# Patient Record
Sex: FEMALE | Race: WHITE | NOT HISPANIC OR LATINO
[De-identification: names, ages, dates, MRNs, and addresses within clinical notes are randomized per-mention and may not be internally consistent; named-entity substitution may affect disease eponyms.]

---

## 2017-10-19 ENCOUNTER — OTHER (OUTPATIENT)
Age: 32
End: 2017-10-19

## 2017-10-30 ENCOUNTER — APPOINTMENT (OUTPATIENT)
Dept: OBGYN | Facility: CLINIC | Age: 32
End: 2017-10-30
Payer: COMMERCIAL

## 2017-10-30 VITALS
HEIGHT: 63 IN | BODY MASS INDEX: 29.23 KG/M2 | SYSTOLIC BLOOD PRESSURE: 122 MMHG | DIASTOLIC BLOOD PRESSURE: 64 MMHG | WEIGHT: 165 LBS

## 2017-10-30 DIAGNOSIS — N63.0 UNSPECIFIED LUMP IN UNSPECIFIED BREAST: ICD-10-CM

## 2017-10-30 LAB
BILIRUB UR QL STRIP: NORMAL
GLUCOSE UR-MCNC: NORMAL
HCG UR QL: 0.2 EU/DL
HCG UR QL: POSITIVE
HGB UR QL STRIP.AUTO: NORMAL
KETONES UR-MCNC: NORMAL
LEUKOCYTE ESTERASE UR QL STRIP: NORMAL
NITRITE UR QL STRIP: NORMAL
PH UR STRIP: 6.5
PROT UR STRIP-MCNC: NORMAL
QUALITY CONTROL: YES
SP GR UR STRIP: 1.01

## 2017-10-30 PROCEDURE — 81003 URINALYSIS AUTO W/O SCOPE: CPT | Mod: QW

## 2017-10-30 PROCEDURE — 99395 PREV VISIT EST AGE 18-39: CPT

## 2017-10-30 PROCEDURE — 81025 URINE PREGNANCY TEST: CPT

## 2017-10-31 LAB
BACTERIA UR CULT: NORMAL
C TRACH RRNA SPEC QL NAA+PROBE: NOT DETECTED
HPV HIGH+LOW RISK DNA PNL CVX: NOT DETECTED
N GONORRHOEA RRNA SPEC QL NAA+PROBE: NOT DETECTED
SOURCE AMPLIFICATION: NORMAL
SOURCE TP AMPLIFICATION: NORMAL
T VAGINALIS RRNA SPEC QL NAA+PROBE: NOT DETECTED

## 2017-11-03 LAB — CYTOLOGY CVX/VAG DOC THIN PREP: NORMAL

## 2017-11-07 ENCOUNTER — APPOINTMENT (OUTPATIENT)
Dept: OBGYN | Facility: CLINIC | Age: 32
End: 2017-11-07

## 2017-11-21 ENCOUNTER — NON-APPOINTMENT (OUTPATIENT)
Age: 32
End: 2017-11-21

## 2017-11-21 ENCOUNTER — APPOINTMENT (OUTPATIENT)
Dept: OBGYN | Facility: CLINIC | Age: 32
End: 2017-11-21
Payer: COMMERCIAL

## 2017-11-21 VITALS
DIASTOLIC BLOOD PRESSURE: 66 MMHG | HEIGHT: 63 IN | SYSTOLIC BLOOD PRESSURE: 130 MMHG | BODY MASS INDEX: 29.59 KG/M2 | WEIGHT: 167 LBS

## 2017-11-21 DIAGNOSIS — Z01.419 ENCOUNTER FOR GYNECOLOGICAL EXAMINATION (GENERAL) (ROUTINE) W/OUT ABNORMAL FINDINGS: ICD-10-CM

## 2017-11-21 DIAGNOSIS — Z32.01 ENCOUNTER FOR PREGNANCY TEST, RESULT POSITIVE: ICD-10-CM

## 2017-11-21 LAB
BILIRUB UR QL STRIP: NORMAL
GLUCOSE UR-MCNC: NORMAL
HCG UR QL: 0.2 EU/DL
HGB UR QL STRIP.AUTO: NORMAL
KETONES UR-MCNC: NORMAL
LEUKOCYTE ESTERASE UR QL STRIP: NORMAL
NITRITE UR QL STRIP: NORMAL
PH UR STRIP: 8.5
PROT UR STRIP-MCNC: NORMAL
SP GR UR STRIP: 1.01

## 2017-11-21 PROCEDURE — 0501F PRENATAL FLOW SHEET: CPT

## 2017-11-21 PROCEDURE — 90688 IIV4 VACCINE SPLT 0.5 ML IM: CPT

## 2017-11-21 PROCEDURE — 36415 COLL VENOUS BLD VENIPUNCTURE: CPT

## 2017-11-21 PROCEDURE — 90471 IMMUNIZATION ADMIN: CPT

## 2017-11-22 LAB
ABO + RH PNL BLD: NORMAL
B19V IGG SER QL IA: 7.7 INDEX
B19V IGG+IGM SER-IMP: NORMAL
B19V IGG+IGM SER-IMP: POSITIVE
B19V IGM FLD-ACNC: 0.2 INDEX
B19V IGM SER-ACNC: NEGATIVE
BLD GP AB SCN SERPL QL: NORMAL
HBV SURFACE AG SERPL QL IA: NONREACTIVE
HCV AB SER QL: NONREACTIVE
HCV S/CO RATIO: 0.19 S/CO
HIV1+2 AB SPEC QL IA.RAPID: NONREACTIVE
RUBV IGG FLD-ACNC: 14.1 INDEX
RUBV IGG SER-IMP: POSITIVE
T PALLIDUM AB SER QL IA: NEGATIVE
TSH SERPL-ACNC: 2.48 UIU/ML
VZV AB TITR SER: POSITIVE
VZV IGG SER IF-ACNC: 2895 INDEX

## 2017-11-23 LAB — LEAD BLD-MCNC: <1 UG/DL

## 2017-11-26 LAB
HGB A MFR BLD: 97.1 %
HGB A2 MFR BLD: 2.6 %
HGB F MFR BLD: 0.3 %
HGB FRACT BLD-IMP: NORMAL

## 2017-12-16 LAB
BASOPHILS # BLD AUTO: 0.02 K/UL
BASOPHILS NFR BLD AUTO: 0.2 %
EOSINOPHIL # BLD AUTO: 0.15 K/UL
EOSINOPHIL NFR BLD AUTO: 1.5 %
HCT VFR BLD CALC: 38.9 %
HGB BLD-MCNC: 12.7 G/DL
IMM GRANULOCYTES NFR BLD AUTO: 0.2 %
LYMPHOCYTES # BLD AUTO: 2.61 K/UL
LYMPHOCYTES NFR BLD AUTO: 25.8 %
MAN DIFF?: NORMAL
MCHC RBC-ENTMCNC: 29.1 PG
MCHC RBC-ENTMCNC: 32.6 GM/DL
MCV RBC AUTO: 89.2 FL
MONOCYTES # BLD AUTO: 0.62 K/UL
MONOCYTES NFR BLD AUTO: 6.1 %
NEUTROPHILS # BLD AUTO: 6.71 K/UL
NEUTROPHILS NFR BLD AUTO: 66.2 %
PLATELET # BLD AUTO: 260 K/UL
RBC # BLD: 4.36 M/UL
RBC # FLD: 13.1 %
WBC # FLD AUTO: 10.13 K/UL

## 2017-12-29 ENCOUNTER — NON-APPOINTMENT (OUTPATIENT)
Age: 32
End: 2017-12-29

## 2017-12-29 ENCOUNTER — APPOINTMENT (OUTPATIENT)
Dept: OBGYN | Facility: CLINIC | Age: 32
End: 2017-12-29
Payer: COMMERCIAL

## 2017-12-29 VITALS
BODY MASS INDEX: 29.59 KG/M2 | SYSTOLIC BLOOD PRESSURE: 124 MMHG | HEIGHT: 63 IN | DIASTOLIC BLOOD PRESSURE: 64 MMHG | WEIGHT: 167 LBS

## 2017-12-29 DIAGNOSIS — Z34.81 ENCOUNTER FOR SUPERVISION OF OTHER NORMAL PREGNANCY, FIRST TRIMESTER: ICD-10-CM

## 2017-12-29 LAB
BILIRUB UR QL STRIP: NORMAL
GLUCOSE UR-MCNC: NORMAL
HCG UR QL: 0.2 EU/DL
HGB UR QL STRIP.AUTO: NORMAL
KETONES UR-MCNC: NORMAL
LEUKOCYTE ESTERASE UR QL STRIP: NORMAL
NITRITE UR QL STRIP: NORMAL
PH UR STRIP: 7.5
PROT UR STRIP-MCNC: NORMAL
SP GR UR STRIP: 1.01

## 2017-12-29 PROCEDURE — 0502F SUBSEQUENT PRENATAL CARE: CPT

## 2017-12-29 PROCEDURE — 36415 COLL VENOUS BLD VENIPUNCTURE: CPT

## 2017-12-30 ENCOUNTER — NON-APPOINTMENT (OUTPATIENT)
Age: 32
End: 2017-12-30

## 2018-01-02 LAB
2ND TRIMESTER DATA: NORMAL
AFP PNL SERPL: NORMAL
AFP SERPL-ACNC: NORMAL
CLINICAL BIOCHEMIST REVIEW: NORMAL
NOTES NTD: NORMAL

## 2018-01-30 ENCOUNTER — APPOINTMENT (OUTPATIENT)
Dept: ANTEPARTUM | Facility: CLINIC | Age: 33
End: 2018-01-30
Payer: COMMERCIAL

## 2018-01-30 ENCOUNTER — ASOB RESULT (OUTPATIENT)
Age: 33
End: 2018-01-30

## 2018-01-30 PROCEDURE — 76811 OB US DETAILED SNGL FETUS: CPT

## 2018-02-03 ENCOUNTER — NON-APPOINTMENT (OUTPATIENT)
Age: 33
End: 2018-02-03

## 2018-02-03 ENCOUNTER — APPOINTMENT (OUTPATIENT)
Dept: OBGYN | Facility: CLINIC | Age: 33
End: 2018-02-03
Payer: COMMERCIAL

## 2018-02-03 VITALS
HEIGHT: 63 IN | DIASTOLIC BLOOD PRESSURE: 80 MMHG | SYSTOLIC BLOOD PRESSURE: 160 MMHG | WEIGHT: 174 LBS | BODY MASS INDEX: 30.83 KG/M2

## 2018-02-03 VITALS — DIASTOLIC BLOOD PRESSURE: 60 MMHG | SYSTOLIC BLOOD PRESSURE: 110 MMHG

## 2018-02-03 VITALS — DIASTOLIC BLOOD PRESSURE: 80 MMHG | SYSTOLIC BLOOD PRESSURE: 140 MMHG

## 2018-02-03 PROCEDURE — 0502F SUBSEQUENT PRENATAL CARE: CPT

## 2018-02-04 LAB
BILIRUB UR QL STRIP: NORMAL
GLUCOSE UR-MCNC: 250
HCG UR QL: 0.2 EU/DL
HGB UR QL STRIP.AUTO: NORMAL
KETONES UR-MCNC: NORMAL
LEUKOCYTE ESTERASE UR QL STRIP: NORMAL
NITRITE UR QL STRIP: NORMAL
PH UR STRIP: 6
PROT UR STRIP-MCNC: NORMAL
SP GR UR STRIP: 1.02

## 2018-02-05 LAB — BACTERIA UR CULT: NORMAL

## 2018-02-21 ENCOUNTER — ASOB RESULT (OUTPATIENT)
Age: 33
End: 2018-02-21

## 2018-02-21 ENCOUNTER — APPOINTMENT (OUTPATIENT)
Dept: ANTEPARTUM | Facility: CLINIC | Age: 33
End: 2018-02-21
Payer: COMMERCIAL

## 2018-02-21 PROCEDURE — 76825 ECHO EXAM OF FETAL HEART: CPT

## 2018-02-21 PROCEDURE — 76827 ECHO EXAM OF FETAL HEART: CPT

## 2018-03-05 ENCOUNTER — NON-APPOINTMENT (OUTPATIENT)
Age: 33
End: 2018-03-05

## 2018-03-05 ENCOUNTER — APPOINTMENT (OUTPATIENT)
Dept: OBGYN | Facility: CLINIC | Age: 33
End: 2018-03-05
Payer: COMMERCIAL

## 2018-03-05 VITALS
WEIGHT: 180 LBS | DIASTOLIC BLOOD PRESSURE: 80 MMHG | BODY MASS INDEX: 31.89 KG/M2 | HEIGHT: 63 IN | SYSTOLIC BLOOD PRESSURE: 122 MMHG

## 2018-03-05 DIAGNOSIS — O21.9 VOMITING OF PREGNANCY, UNSPECIFIED: ICD-10-CM

## 2018-03-05 DIAGNOSIS — Z92.29 PERSONAL HISTORY OF OTHER DRUG THERAPY: ICD-10-CM

## 2018-03-05 PROCEDURE — 36415 COLL VENOUS BLD VENIPUNCTURE: CPT

## 2018-03-05 PROCEDURE — 0502F SUBSEQUENT PRENATAL CARE: CPT

## 2018-03-09 LAB
BASOPHILS # BLD AUTO: 0.01 K/UL
BASOPHILS NFR BLD AUTO: 0.1 %
EOSINOPHIL # BLD AUTO: 0.07 K/UL
EOSINOPHIL NFR BLD AUTO: 0.9 %
GLUCOSE 1H P 50 G GLC PO SERPL-MCNC: 108 MG/DL
HCT VFR BLD CALC: 37.4 %
HGB BLD-MCNC: 12.2 G/DL
IMM GRANULOCYTES NFR BLD AUTO: 0.5 %
LYMPHOCYTES # BLD AUTO: 1.24 K/UL
LYMPHOCYTES NFR BLD AUTO: 15.8 %
MAN DIFF?: NORMAL
MCHC RBC-ENTMCNC: 29.3 PG
MCHC RBC-ENTMCNC: 32.6 GM/DL
MCV RBC AUTO: 89.9 FL
MONOCYTES # BLD AUTO: 0.37 K/UL
MONOCYTES NFR BLD AUTO: 4.7 %
NEUTROPHILS # BLD AUTO: 6.12 K/UL
NEUTROPHILS NFR BLD AUTO: 78 %
PLATELET # BLD AUTO: 244 K/UL
RBC # BLD: 4.16 M/UL
RBC # FLD: 13.9 %
WBC # FLD AUTO: 7.85 K/UL

## 2018-03-20 ENCOUNTER — APPOINTMENT (OUTPATIENT)
Dept: NEUROLOGY | Facility: CLINIC | Age: 33
End: 2018-03-20

## 2018-04-03 ENCOUNTER — APPOINTMENT (OUTPATIENT)
Dept: OBGYN | Facility: CLINIC | Age: 33
End: 2018-04-03
Payer: COMMERCIAL

## 2018-04-03 ENCOUNTER — NON-APPOINTMENT (OUTPATIENT)
Age: 33
End: 2018-04-03

## 2018-04-03 VITALS
WEIGHT: 189 LBS | BODY MASS INDEX: 33.49 KG/M2 | DIASTOLIC BLOOD PRESSURE: 68 MMHG | HEIGHT: 63 IN | SYSTOLIC BLOOD PRESSURE: 122 MMHG

## 2018-04-03 DIAGNOSIS — Z34.92 ENCOUNTER FOR SUPERVISION OF NORMAL PREGNANCY, UNSPECIFIED, SECOND TRIMESTER: ICD-10-CM

## 2018-04-03 LAB
BILIRUB UR QL STRIP: NORMAL
GLUCOSE UR-MCNC: NORMAL
HCG UR QL: 0.2 EU/DL
HGB UR QL STRIP.AUTO: NORMAL
KETONES UR-MCNC: NORMAL
LEUKOCYTE ESTERASE UR QL STRIP: NORMAL
NITRITE UR QL STRIP: NORMAL
PH UR STRIP: 6
PROT UR STRIP-MCNC: NORMAL
SP GR UR STRIP: 1.01

## 2018-04-03 PROCEDURE — 0502F SUBSEQUENT PRENATAL CARE: CPT

## 2018-04-05 ENCOUNTER — NON-APPOINTMENT (OUTPATIENT)
Age: 33
End: 2018-04-05

## 2018-04-24 ENCOUNTER — APPOINTMENT (OUTPATIENT)
Dept: OBGYN | Facility: CLINIC | Age: 33
End: 2018-04-24
Payer: COMMERCIAL

## 2018-04-24 VITALS
BODY MASS INDEX: 34.55 KG/M2 | HEIGHT: 63 IN | WEIGHT: 195 LBS | SYSTOLIC BLOOD PRESSURE: 110 MMHG | DIASTOLIC BLOOD PRESSURE: 80 MMHG

## 2018-04-24 LAB
BILIRUB UR QL STRIP: NORMAL
GLUCOSE UR-MCNC: NORMAL
HCG UR QL: 0.2 EU/DL
HGB UR QL STRIP.AUTO: NORMAL
KETONES UR-MCNC: NORMAL
LEUKOCYTE ESTERASE UR QL STRIP: NORMAL
NITRITE UR QL STRIP: NORMAL
PH UR STRIP: 6
PROT UR STRIP-MCNC: NORMAL
SP GR UR STRIP: 1.02

## 2018-04-24 PROCEDURE — 0502F SUBSEQUENT PRENATAL CARE: CPT

## 2018-04-24 PROCEDURE — 76816 OB US FOLLOW-UP PER FETUS: CPT

## 2018-04-25 ENCOUNTER — NON-APPOINTMENT (OUTPATIENT)
Age: 33
End: 2018-04-25

## 2018-04-26 ENCOUNTER — NON-APPOINTMENT (OUTPATIENT)
Age: 33
End: 2018-04-26

## 2018-05-08 ENCOUNTER — NON-APPOINTMENT (OUTPATIENT)
Age: 33
End: 2018-05-08

## 2018-05-08 ENCOUNTER — APPOINTMENT (OUTPATIENT)
Dept: OBGYN | Facility: CLINIC | Age: 33
End: 2018-05-08
Payer: COMMERCIAL

## 2018-05-08 VITALS
SYSTOLIC BLOOD PRESSURE: 120 MMHG | HEIGHT: 63 IN | WEIGHT: 198 LBS | DIASTOLIC BLOOD PRESSURE: 68 MMHG | BODY MASS INDEX: 35.08 KG/M2

## 2018-05-08 PROCEDURE — 0502F SUBSEQUENT PRENATAL CARE: CPT

## 2018-05-09 ENCOUNTER — NON-APPOINTMENT (OUTPATIENT)
Age: 33
End: 2018-05-09

## 2018-05-22 ENCOUNTER — NON-APPOINTMENT (OUTPATIENT)
Age: 33
End: 2018-05-22

## 2018-05-22 ENCOUNTER — APPOINTMENT (OUTPATIENT)
Dept: OBGYN | Facility: CLINIC | Age: 33
End: 2018-05-22
Payer: COMMERCIAL

## 2018-05-22 VITALS
WEIGHT: 200 LBS | HEIGHT: 63 IN | BODY MASS INDEX: 35.44 KG/M2 | DIASTOLIC BLOOD PRESSURE: 88 MMHG | SYSTOLIC BLOOD PRESSURE: 140 MMHG

## 2018-05-22 LAB
BILIRUB UR QL STRIP: NORMAL
GLUCOSE UR-MCNC: 250
HCG UR QL: 0.2 EU/DL
HGB UR QL STRIP.AUTO: NORMAL
KETONES UR-MCNC: NORMAL
LEUKOCYTE ESTERASE UR QL STRIP: NORMAL
NITRITE UR QL STRIP: NORMAL
PH UR STRIP: 6
PROT UR STRIP-MCNC: NORMAL
SP GR UR STRIP: 1.01

## 2018-05-22 PROCEDURE — 0502F SUBSEQUENT PRENATAL CARE: CPT

## 2018-05-22 PROCEDURE — 36415 COLL VENOUS BLD VENIPUNCTURE: CPT

## 2018-05-22 RX ORDER — METOCLOPRAMIDE 10 MG/1
10 TABLET ORAL
Qty: 120 | Refills: 0 | Status: DISCONTINUED | COMMUNITY
Start: 2017-12-29 | End: 2018-05-22

## 2018-05-23 LAB
BASOPHILS # BLD AUTO: 0.02 K/UL
BASOPHILS NFR BLD AUTO: 0.2 %
EOSINOPHIL # BLD AUTO: 0.09 K/UL
EOSINOPHIL NFR BLD AUTO: 1 %
HCT VFR BLD CALC: 38.8 %
HGB BLD-MCNC: 11.9 G/DL
HIV1+2 AB SPEC QL IA.RAPID: NONREACTIVE
IMM GRANULOCYTES NFR BLD AUTO: 0.9 %
LYMPHOCYTES # BLD AUTO: 1.87 K/UL
LYMPHOCYTES NFR BLD AUTO: 21.1 %
MAN DIFF?: NORMAL
MCHC RBC-ENTMCNC: 26.7 PG
MCHC RBC-ENTMCNC: 30.7 GM/DL
MCV RBC AUTO: 87 FL
MONOCYTES # BLD AUTO: 0.79 K/UL
MONOCYTES NFR BLD AUTO: 8.9 %
NEUTROPHILS # BLD AUTO: 6 K/UL
NEUTROPHILS NFR BLD AUTO: 67.9 %
PLATELET # BLD AUTO: 259 K/UL
RBC # BLD: 4.46 M/UL
RBC # FLD: 14 %
WBC # FLD AUTO: 8.85 K/UL

## 2018-05-26 LAB
GP B STREP DNA SPEC QL NAA+PROBE: DETECTED
GP B STREP DNA SPEC QL NAA+PROBE: NORMAL
SOURCE GBS: NORMAL

## 2018-05-29 ENCOUNTER — APPOINTMENT (OUTPATIENT)
Dept: OBGYN | Facility: CLINIC | Age: 33
End: 2018-05-29
Payer: COMMERCIAL

## 2018-05-29 ENCOUNTER — NON-APPOINTMENT (OUTPATIENT)
Age: 33
End: 2018-05-29

## 2018-05-29 VITALS
BODY MASS INDEX: 35.79 KG/M2 | DIASTOLIC BLOOD PRESSURE: 80 MMHG | HEIGHT: 63 IN | WEIGHT: 202 LBS | SYSTOLIC BLOOD PRESSURE: 138 MMHG

## 2018-05-29 LAB
BILIRUB UR QL STRIP: NORMAL
GLUCOSE UR-MCNC: NORMAL
HCG UR QL: 0.2 EU/DL
HGB UR QL STRIP.AUTO: NORMAL
KETONES UR-MCNC: NORMAL
LEUKOCYTE ESTERASE UR QL STRIP: NORMAL
NITRITE UR QL STRIP: NORMAL
PH UR STRIP: 5.5
PROT UR STRIP-MCNC: NORMAL
SP GR UR STRIP: <1.005

## 2018-05-29 PROCEDURE — 99213 OFFICE O/P EST LOW 20 MIN: CPT

## 2018-06-05 ENCOUNTER — APPOINTMENT (OUTPATIENT)
Dept: OBGYN | Facility: CLINIC | Age: 33
End: 2018-06-05
Payer: COMMERCIAL

## 2018-06-05 ENCOUNTER — NON-APPOINTMENT (OUTPATIENT)
Age: 33
End: 2018-06-05

## 2018-06-05 VITALS
HEIGHT: 63 IN | DIASTOLIC BLOOD PRESSURE: 70 MMHG | BODY MASS INDEX: 36.5 KG/M2 | SYSTOLIC BLOOD PRESSURE: 140 MMHG | WEIGHT: 206 LBS

## 2018-06-05 PROCEDURE — 0502F SUBSEQUENT PRENATAL CARE: CPT

## 2018-06-06 ENCOUNTER — NON-APPOINTMENT (OUTPATIENT)
Age: 33
End: 2018-06-06

## 2018-06-12 ENCOUNTER — NON-APPOINTMENT (OUTPATIENT)
Age: 33
End: 2018-06-12

## 2018-06-12 ENCOUNTER — APPOINTMENT (OUTPATIENT)
Dept: OBGYN | Facility: CLINIC | Age: 33
End: 2018-06-12
Payer: COMMERCIAL

## 2018-06-12 VITALS
DIASTOLIC BLOOD PRESSURE: 68 MMHG | BODY MASS INDEX: 36.86 KG/M2 | HEIGHT: 63 IN | WEIGHT: 208 LBS | SYSTOLIC BLOOD PRESSURE: 102 MMHG

## 2018-06-12 PROCEDURE — 0502F SUBSEQUENT PRENATAL CARE: CPT

## 2018-06-13 ENCOUNTER — NON-APPOINTMENT (OUTPATIENT)
Age: 33
End: 2018-06-13

## 2018-06-13 LAB
ALBUMIN SERPL ELPH-MCNC: 3.9 G/DL
ALP BLD-CCNC: 137 U/L
ALT SERPL-CCNC: 15 U/L
ANION GAP SERPL CALC-SCNC: 14 MMOL/L
AST SERPL-CCNC: 22 U/L
BILIRUB SERPL-MCNC: 0.2 MG/DL
BILIRUB UR QL STRIP: NORMAL
BUN SERPL-MCNC: 11 MG/DL
CALCIUM SERPL-MCNC: 9.1 MG/DL
CHLORIDE SERPL-SCNC: 103 MMOL/L
CO2 SERPL-SCNC: 20 MMOL/L
CREAT SERPL-MCNC: 0.66 MG/DL
GLUCOSE SERPL-MCNC: 75 MG/DL
GLUCOSE UR-MCNC: NORMAL
HCG UR QL: 0.2 EU/DL
HGB UR QL STRIP.AUTO: NORMAL
KETONES UR-MCNC: NORMAL
LEUKOCYTE ESTERASE UR QL STRIP: NORMAL
NITRITE UR QL STRIP: NORMAL
PH UR STRIP: 6
POTASSIUM SERPL-SCNC: 4.5 MMOL/L
PROT SERPL-MCNC: 6.6 G/DL
PROT UR STRIP-MCNC: NORMAL
SODIUM SERPL-SCNC: 137 MMOL/L
SP GR UR STRIP: 1.01

## 2018-06-15 ENCOUNTER — TRANSCRIPTION ENCOUNTER (OUTPATIENT)
Age: 33
End: 2018-06-15

## 2018-06-15 LAB — BILE AC SER-MCNC: 4.2 UMOL/L

## 2018-06-16 ENCOUNTER — INPATIENT (INPATIENT)
Facility: HOSPITAL | Age: 33
LOS: 2 days | Discharge: HOME | End: 2018-06-19
Attending: OBSTETRICS & GYNECOLOGY | Admitting: OBSTETRICS & GYNECOLOGY
Payer: COMMERCIAL

## 2018-06-16 ENCOUNTER — TRANSCRIPTION ENCOUNTER (OUTPATIENT)
Age: 33
End: 2018-06-16

## 2018-06-16 VITALS — HEIGHT: 63 IN | WEIGHT: 202.83 LBS

## 2018-06-16 DIAGNOSIS — O26.899 OTHER SPECIFIED PREGNANCY RELATED CONDITIONS, UNSPECIFIED TRIMESTER: ICD-10-CM

## 2018-06-16 DIAGNOSIS — Z3A.00 WEEKS OF GESTATION OF PREGNANCY NOT SPECIFIED: ICD-10-CM

## 2018-06-16 DIAGNOSIS — Z04.9 ENCOUNTER FOR EXAMINATION AND OBSERVATION FOR UNSPECIFIED REASON: ICD-10-CM

## 2018-06-16 LAB
AMNISURE ROM (RUPTURE OF MEMBRANES): POSITIVE — SIGNIFICANT CHANGE UP
BASOPHILS # BLD AUTO: 0.03 K/UL — SIGNIFICANT CHANGE UP (ref 0–0.2)
BASOPHILS NFR BLD AUTO: 0.2 % — SIGNIFICANT CHANGE UP (ref 0–2)
BLD GP AB SCN SERPL QL: NEGATIVE — SIGNIFICANT CHANGE UP
EOSINOPHIL # BLD AUTO: 0.01 K/UL — SIGNIFICANT CHANGE UP (ref 0–0.5)
EOSINOPHIL NFR BLD AUTO: 0.1 % — SIGNIFICANT CHANGE UP (ref 0–6)
HCT VFR BLD CALC: 37.4 % — SIGNIFICANT CHANGE UP (ref 34.5–45)
HGB BLD-MCNC: 12.3 G/DL — SIGNIFICANT CHANGE UP (ref 11.5–15.5)
IMM GRANULOCYTES # BLD AUTO: 0.07 # — SIGNIFICANT CHANGE UP
IMM GRANULOCYTES NFR BLD AUTO: 0.6 % — SIGNIFICANT CHANGE UP (ref 0–1.5)
LYMPHOCYTES # BLD AUTO: 1.47 K/UL — SIGNIFICANT CHANGE UP (ref 1–3.3)
LYMPHOCYTES # BLD AUTO: 12.2 % — LOW (ref 13–44)
MCHC RBC-ENTMCNC: 26.1 PG — LOW (ref 27–34)
MCHC RBC-ENTMCNC: 32.9 % — SIGNIFICANT CHANGE UP (ref 32–36)
MCV RBC AUTO: 79.4 FL — LOW (ref 80–100)
MONOCYTES # BLD AUTO: 0.71 K/UL — SIGNIFICANT CHANGE UP (ref 0–0.9)
MONOCYTES NFR BLD AUTO: 5.9 % — SIGNIFICANT CHANGE UP (ref 2–14)
NEUTROPHILS # BLD AUTO: 9.72 K/UL — HIGH (ref 1.8–7.4)
NEUTROPHILS NFR BLD AUTO: 81 % — HIGH (ref 43–77)
NRBC # FLD: 0 — SIGNIFICANT CHANGE UP
PLATELET # BLD AUTO: 253 K/UL — SIGNIFICANT CHANGE UP (ref 150–400)
PMV BLD: 10.6 FL — SIGNIFICANT CHANGE UP (ref 7–13)
RBC # BLD: 4.71 M/UL — SIGNIFICANT CHANGE UP (ref 3.8–5.2)
RBC # FLD: 13.6 % — SIGNIFICANT CHANGE UP (ref 10.3–14.5)
RH IG SCN BLD-IMP: POSITIVE — SIGNIFICANT CHANGE UP
WBC # BLD: 12.01 K/UL — HIGH (ref 3.8–10.5)
WBC # FLD AUTO: 12.01 K/UL — HIGH (ref 3.8–10.5)

## 2018-06-16 PROCEDURE — 59510 CESAREAN DELIVERY: CPT | Mod: U9,GC

## 2018-06-16 RX ORDER — HEPARIN SODIUM 5000 [USP'U]/ML
5000 INJECTION INTRAVENOUS; SUBCUTANEOUS EVERY 12 HOURS
Qty: 0 | Refills: 0 | Status: DISCONTINUED | OUTPATIENT
Start: 2018-06-16 | End: 2018-06-19

## 2018-06-16 RX ORDER — OXYTOCIN 10 UNIT/ML
333.33 VIAL (ML) INJECTION
Qty: 20 | Refills: 0 | Status: DISCONTINUED | OUTPATIENT
Start: 2018-06-16 | End: 2018-06-18

## 2018-06-16 RX ORDER — GLYCERIN ADULT
1 SUPPOSITORY, RECTAL RECTAL AT BEDTIME
Qty: 0 | Refills: 0 | Status: DISCONTINUED | OUTPATIENT
Start: 2018-06-17 | End: 2018-06-19

## 2018-06-16 RX ORDER — SODIUM CHLORIDE 9 MG/ML
1000 INJECTION, SOLUTION INTRAVENOUS
Qty: 0 | Refills: 0 | Status: DISCONTINUED | OUTPATIENT
Start: 2018-06-16 | End: 2018-06-16

## 2018-06-16 RX ORDER — AMPICILLIN TRIHYDRATE 250 MG
1 CAPSULE ORAL EVERY 4 HOURS
Qty: 0 | Refills: 0 | Status: DISCONTINUED | OUTPATIENT
Start: 2018-06-16 | End: 2018-06-16

## 2018-06-16 RX ORDER — ACETAMINOPHEN 500 MG
975 TABLET ORAL EVERY 6 HOURS
Qty: 0 | Refills: 0 | Status: DISCONTINUED | OUTPATIENT
Start: 2018-06-17 | End: 2018-06-19

## 2018-06-16 RX ORDER — FERROUS SULFATE 325(65) MG
325 TABLET ORAL DAILY
Qty: 0 | Refills: 0 | Status: DISCONTINUED | OUTPATIENT
Start: 2018-06-17 | End: 2018-06-19

## 2018-06-16 RX ORDER — SODIUM CHLORIDE 9 MG/ML
1000 INJECTION, SOLUTION INTRAVENOUS
Qty: 0 | Refills: 0 | Status: DISCONTINUED | OUTPATIENT
Start: 2018-06-17 | End: 2018-06-18

## 2018-06-16 RX ORDER — TETANUS TOXOID, REDUCED DIPHTHERIA TOXOID AND ACELLULAR PERTUSSIS VACCINE, ADSORBED 5; 2.5; 8; 8; 2.5 [IU]/.5ML; [IU]/.5ML; UG/.5ML; UG/.5ML; UG/.5ML
0.5 SUSPENSION INTRAMUSCULAR ONCE
Qty: 0 | Refills: 0 | Status: DISCONTINUED | OUTPATIENT
Start: 2018-06-17 | End: 2018-06-19

## 2018-06-16 RX ORDER — FAMOTIDINE 10 MG/ML
20 INJECTION INTRAVENOUS ONCE
Qty: 0 | Refills: 0 | Status: COMPLETED | OUTPATIENT
Start: 2018-06-16 | End: 2018-06-16

## 2018-06-16 RX ORDER — SIMETHICONE 80 MG/1
80 TABLET, CHEWABLE ORAL EVERY 4 HOURS
Qty: 0 | Refills: 0 | Status: DISCONTINUED | OUTPATIENT
Start: 2018-06-17 | End: 2018-06-19

## 2018-06-16 RX ORDER — SODIUM CHLORIDE 9 MG/ML
500 INJECTION, SOLUTION INTRAVENOUS ONCE
Qty: 0 | Refills: 0 | Status: DISCONTINUED | OUTPATIENT
Start: 2018-06-16 | End: 2018-06-16

## 2018-06-16 RX ORDER — OXYTOCIN 10 UNIT/ML
41.67 VIAL (ML) INJECTION
Qty: 20 | Refills: 0 | Status: DISCONTINUED | OUTPATIENT
Start: 2018-06-16 | End: 2018-06-18

## 2018-06-16 RX ORDER — LANOLIN
1 OINTMENT (GRAM) TOPICAL
Qty: 0 | Refills: 0 | Status: DISCONTINUED | OUTPATIENT
Start: 2018-06-17 | End: 2018-06-19

## 2018-06-16 RX ORDER — AMPICILLIN TRIHYDRATE 250 MG
2 CAPSULE ORAL ONCE
Qty: 0 | Refills: 0 | Status: COMPLETED | OUTPATIENT
Start: 2018-06-16 | End: 2018-06-16

## 2018-06-16 RX ORDER — AMPICILLIN TRIHYDRATE 250 MG
CAPSULE ORAL
Qty: 0 | Refills: 0 | Status: DISCONTINUED | OUTPATIENT
Start: 2018-06-16 | End: 2018-06-16

## 2018-06-16 RX ORDER — OXYTOCIN 10 UNIT/ML
333.33 VIAL (ML) INJECTION
Qty: 20 | Refills: 0 | Status: DISCONTINUED | OUTPATIENT
Start: 2018-06-16 | End: 2018-06-16

## 2018-06-16 RX ORDER — SODIUM CHLORIDE 9 MG/ML
1000 INJECTION, SOLUTION INTRAVENOUS
Qty: 0 | Refills: 0 | Status: DISCONTINUED | OUTPATIENT
Start: 2018-06-16 | End: 2018-06-18

## 2018-06-16 RX ORDER — METOCLOPRAMIDE HCL 10 MG
10 TABLET ORAL ONCE
Qty: 0 | Refills: 0 | Status: COMPLETED | OUTPATIENT
Start: 2018-06-16 | End: 2018-06-16

## 2018-06-16 RX ORDER — DIPHENHYDRAMINE HCL 50 MG
25 CAPSULE ORAL EVERY 6 HOURS
Qty: 0 | Refills: 0 | Status: DISCONTINUED | OUTPATIENT
Start: 2018-06-17 | End: 2018-06-19

## 2018-06-16 RX ORDER — OXYCODONE HYDROCHLORIDE 5 MG/1
5 TABLET ORAL
Qty: 0 | Refills: 0 | Status: COMPLETED | OUTPATIENT
Start: 2018-06-17 | End: 2018-06-24

## 2018-06-16 RX ORDER — OXYCODONE HYDROCHLORIDE 5 MG/1
5 TABLET ORAL EVERY 4 HOURS
Qty: 0 | Refills: 0 | Status: COMPLETED | OUTPATIENT
Start: 2018-06-18 | End: 2018-06-25

## 2018-06-16 RX ORDER — CITRIC ACID/SODIUM CITRATE 300-500 MG
30 SOLUTION, ORAL ORAL ONCE
Qty: 0 | Refills: 0 | Status: DISCONTINUED | OUTPATIENT
Start: 2018-06-16 | End: 2018-06-16

## 2018-06-16 RX ORDER — KETOROLAC TROMETHAMINE 30 MG/ML
30 SYRINGE (ML) INJECTION EVERY 6 HOURS
Qty: 0 | Refills: 0 | Status: DISCONTINUED | OUTPATIENT
Start: 2018-06-17 | End: 2018-06-18

## 2018-06-16 RX ORDER — IBUPROFEN 200 MG
600 TABLET ORAL EVERY 6 HOURS
Qty: 0 | Refills: 0 | Status: COMPLETED | OUTPATIENT
Start: 2018-06-17 | End: 2019-05-16

## 2018-06-16 RX ORDER — OXYTOCIN 10 UNIT/ML
41.67 VIAL (ML) INJECTION
Qty: 20 | Refills: 0 | Status: DISCONTINUED | OUTPATIENT
Start: 2018-06-16 | End: 2018-06-16

## 2018-06-16 RX ORDER — DOCUSATE SODIUM 100 MG
100 CAPSULE ORAL
Qty: 0 | Refills: 0 | Status: DISCONTINUED | OUTPATIENT
Start: 2018-06-17 | End: 2018-06-19

## 2018-06-16 RX ADMIN — Medication 125 MILLIUNIT(S)/MIN: at 23:37

## 2018-06-16 RX ADMIN — Medication 125 MILLIUNIT(S)/MIN: at 22:24

## 2018-06-16 RX ADMIN — Medication 10 MILLIGRAM(S): at 19:14

## 2018-06-16 RX ADMIN — SODIUM CHLORIDE 75 MILLILITER(S): 9 INJECTION, SOLUTION INTRAVENOUS at 23:33

## 2018-06-16 RX ADMIN — FAMOTIDINE 20 MILLIGRAM(S): 10 INJECTION INTRAVENOUS at 19:14

## 2018-06-16 RX ADMIN — Medication 216 GRAM(S): at 17:43

## 2018-06-16 NOTE — PATIENT PROFILE OB - AS DELIV COMPLICATIONS OB
nuchal cord/meconium stained fluid/can x 4 premature rupture of membranes prior to labor/nuchal cord

## 2018-06-16 NOTE — DISCHARGE NOTE OB - CARE PLAN
Principal Discharge DX:	 delivery delivered  Goal:	recovery  Assessment and plan of treatment:	with Dr. Turner in 2 weeks

## 2018-06-16 NOTE — DISCHARGE NOTE OB - MEDICATION SUMMARY - MEDICATIONS TO CHANGE
I will SWITCH the dose or number of times a day I take the medications listed below when I get home from the hospital:    Prenatal 1 oral capsule I will SWITCH the dose or number of times a day I take the medications listed below when I get home from the hospital:  None

## 2018-06-16 NOTE — DISCHARGE NOTE OB - CARE PROVIDER_API CALL
Addi Turner), Obstetrics and Gynecology  [address] [facility]  [address]nd [place] [address]  Phone: [phone]  Fax: [phone]

## 2018-06-16 NOTE — DISCHARGE NOTE OB - PATIENT PORTAL LINK FT
You can access the [facility] Patient Portal, offered by [facility], by registering with the following website: [email or web address]

## 2018-06-17 LAB
HCT VFR BLD CALC: 35.3 % — SIGNIFICANT CHANGE UP (ref 34.5–45)
HGB BLD-MCNC: 11.4 G/DL — LOW (ref 11.5–15.5)
MCHC RBC-ENTMCNC: 26.6 PG — LOW (ref 27–34)
MCHC RBC-ENTMCNC: 32.3 % — SIGNIFICANT CHANGE UP (ref 32–36)
MCV RBC AUTO: 82.3 FL — SIGNIFICANT CHANGE UP (ref 80–100)
NRBC # FLD: 0 — SIGNIFICANT CHANGE UP
PLATELET # BLD AUTO: 207 K/UL — SIGNIFICANT CHANGE UP (ref 150–400)
PMV BLD: 10.7 FL — SIGNIFICANT CHANGE UP (ref 7–13)
RBC # BLD: 4.29 M/UL — SIGNIFICANT CHANGE UP (ref 3.8–5.2)
RBC # FLD: 13.9 % — SIGNIFICANT CHANGE UP (ref 10.3–14.5)
T PALLIDUM AB TITR SER: NEGATIVE — SIGNIFICANT CHANGE UP
WBC # BLD: 9.94 K/UL — SIGNIFICANT CHANGE UP (ref 3.8–10.5)
WBC # FLD AUTO: 9.94 K/UL — SIGNIFICANT CHANGE UP (ref 3.8–10.5)

## 2018-06-17 RX ADMIN — Medication 30 MILLIGRAM(S): at 12:56

## 2018-06-17 RX ADMIN — Medication 30 MILLIGRAM(S): at 19:31

## 2018-06-17 RX ADMIN — Medication 30 MILLIGRAM(S): at 18:47

## 2018-06-17 RX ADMIN — Medication 30 MILLIGRAM(S): at 13:23

## 2018-06-17 RX ADMIN — HEPARIN SODIUM 5000 UNIT(S): 5000 INJECTION INTRAVENOUS; SUBCUTANEOUS at 02:14

## 2018-06-17 RX ADMIN — Medication 975 MILLIGRAM(S): at 19:31

## 2018-06-17 RX ADMIN — Medication 1 TABLET(S): at 12:57

## 2018-06-17 RX ADMIN — Medication 975 MILLIGRAM(S): at 18:48

## 2018-06-17 RX ADMIN — Medication 30 MILLIGRAM(S): at 07:01

## 2018-06-17 RX ADMIN — Medication 975 MILLIGRAM(S): at 13:23

## 2018-06-17 RX ADMIN — Medication 975 MILLIGRAM(S): at 06:32

## 2018-06-17 RX ADMIN — Medication 975 MILLIGRAM(S): at 07:01

## 2018-06-17 RX ADMIN — HEPARIN SODIUM 5000 UNIT(S): 5000 INJECTION INTRAVENOUS; SUBCUTANEOUS at 12:56

## 2018-06-17 RX ADMIN — Medication 30 MILLIGRAM(S): at 06:33

## 2018-06-17 RX ADMIN — Medication 975 MILLIGRAM(S): at 12:57

## 2018-06-17 RX ADMIN — Medication 325 MILLIGRAM(S): at 12:57

## 2018-06-17 NOTE — PROGRESS NOTE ADULT - PROBLEM SELECTOR PLAN 1
-Benadryl for pruritis 2/2 anesthesia  - Continue regular diet.  - Increase ambulation.  -  continue PCEA for pain.  - Discontinue mooney catheter at 24 hours post-op   - F/u AM CBC -Benadryl for pruritis 2/2 anesthesia  - Continue regular diet.  - Increase ambulation.  -  continue po pain meds  - Discontinue mooney catheter at 24 hours post-op   - F/u AM CBC

## 2018-06-18 RX ORDER — OXYCODONE HYDROCHLORIDE 5 MG/1
5 TABLET ORAL
Qty: 0 | Refills: 0 | Status: DISCONTINUED | OUTPATIENT
Start: 2018-06-18 | End: 2018-06-19

## 2018-06-18 RX ORDER — IBUPROFEN 200 MG
600 TABLET ORAL EVERY 6 HOURS
Qty: 0 | Refills: 0 | Status: DISCONTINUED | OUTPATIENT
Start: 2018-06-18 | End: 2018-06-19

## 2018-06-18 RX ORDER — OXYCODONE HYDROCHLORIDE 5 MG/1
5 TABLET ORAL EVERY 4 HOURS
Qty: 0 | Refills: 0 | Status: DISCONTINUED | OUTPATIENT
Start: 2018-06-18 | End: 2018-06-19

## 2018-06-18 RX ORDER — MAGNESIUM HYDROXIDE 400 MG/1
30 TABLET, CHEWABLE ORAL DAILY
Qty: 0 | Refills: 0 | Status: DISCONTINUED | OUTPATIENT
Start: 2018-06-18 | End: 2018-06-19

## 2018-06-18 RX ADMIN — Medication 600 MILLIGRAM(S): at 21:09

## 2018-06-18 RX ADMIN — Medication 600 MILLIGRAM(S): at 08:40

## 2018-06-18 RX ADMIN — HEPARIN SODIUM 5000 UNIT(S): 5000 INJECTION INTRAVENOUS; SUBCUTANEOUS at 11:59

## 2018-06-18 RX ADMIN — Medication 975 MILLIGRAM(S): at 00:36

## 2018-06-18 RX ADMIN — Medication 600 MILLIGRAM(S): at 08:19

## 2018-06-18 RX ADMIN — Medication 1 TABLET(S): at 11:59

## 2018-06-18 RX ADMIN — Medication 600 MILLIGRAM(S): at 20:09

## 2018-06-18 RX ADMIN — Medication 100 MILLIGRAM(S): at 11:59

## 2018-06-18 RX ADMIN — HEPARIN SODIUM 5000 UNIT(S): 5000 INJECTION INTRAVENOUS; SUBCUTANEOUS at 00:11

## 2018-06-18 RX ADMIN — Medication 600 MILLIGRAM(S): at 03:36

## 2018-06-18 RX ADMIN — Medication 975 MILLIGRAM(S): at 00:09

## 2018-06-18 RX ADMIN — Medication 975 MILLIGRAM(S): at 08:19

## 2018-06-18 RX ADMIN — Medication 325 MILLIGRAM(S): at 12:00

## 2018-06-18 RX ADMIN — Medication 975 MILLIGRAM(S): at 20:09

## 2018-06-18 RX ADMIN — Medication 600 MILLIGRAM(S): at 00:10

## 2018-06-18 RX ADMIN — Medication 975 MILLIGRAM(S): at 21:09

## 2018-06-18 RX ADMIN — Medication 975 MILLIGRAM(S): at 08:40

## 2018-06-18 NOTE — PROGRESS NOTE ADULT - ASSESSMENT
INTERVAL HPI/OVERNIGHT EVENTS:  32y Female s/p c section under spinal anesthesia with duramorph for post op analgesia on 6/16/18    Vital Signs Last 24 Hrs  T(C): 36.7 (18 Jun 2018 01:36), Max: 37.2 (17 Jun 2018 21:23)  T(F): 98.1 (18 Jun 2018 01:36), Max: 98.9 (17 Jun 2018 21:23)  HR: 95 (18 Jun 2018 01:36) (86 - 96)  BP: 105/68 (18 Jun 2018 01:36) (100/52 - 116/63)  BP(mean): --  RR: 18 (18 Jun 2018 01:36) (17 - 18)  SpO2: 98% (18 Jun 2018 01:36) (98% - 100%)    Patient seen 07:21, doing well, no anesthetic complications or complaints noted or reported.  Pain is controlled.    Walking.  Had puritis. No other issues.
A/P: 33yo POD#1 s/p rLTCS .  Patient is stable and doing well post-operatively.

## 2018-06-19 ENCOUNTER — APPOINTMENT (OUTPATIENT)
Dept: OBGYN | Facility: CLINIC | Age: 33
End: 2018-06-19

## 2018-06-19 VITALS
DIASTOLIC BLOOD PRESSURE: 66 MMHG | TEMPERATURE: 99 F | HEART RATE: 87 BPM | SYSTOLIC BLOOD PRESSURE: 110 MMHG | RESPIRATION RATE: 17 BRPM | OXYGEN SATURATION: 99 %

## 2018-06-19 RX ORDER — ACETAMINOPHEN 500 MG
3 TABLET ORAL
Qty: 0 | Refills: 0 | COMMUNITY
Start: 2018-06-19

## 2018-06-19 RX ORDER — IBUPROFEN 200 MG
1 TABLET ORAL
Qty: 0 | Refills: 0 | COMMUNITY
Start: 2018-06-19

## 2018-06-19 RX ADMIN — MAGNESIUM HYDROXIDE 30 MILLILITER(S): 400 TABLET, CHEWABLE ORAL at 06:30

## 2018-06-19 RX ADMIN — HEPARIN SODIUM 5000 UNIT(S): 5000 INJECTION INTRAVENOUS; SUBCUTANEOUS at 00:19

## 2018-06-19 RX ADMIN — Medication 600 MILLIGRAM(S): at 03:22

## 2018-06-19 RX ADMIN — Medication 975 MILLIGRAM(S): at 04:22

## 2018-06-19 RX ADMIN — Medication 600 MILLIGRAM(S): at 04:22

## 2018-06-19 RX ADMIN — Medication 975 MILLIGRAM(S): at 03:22

## 2018-06-19 NOTE — PROGRESS NOTE ADULT - SUBJECTIVE AND OBJECTIVE BOX
SUBJECTIVE:    Pain: Controlled    Complaints: None    MILESTONES:    Alert and Oriented x 3  [ x ]  Out of bed/ ambulating. [ x ]  Flatus:   Positive [ x ]  Negative [  ]  Bowel movement  [  ] Positive [  ] Negative   Voiding [x  ] Due to void [  ]   Muniz/Indwelling catheter in place [  ]  Diet: Regular [ x ]  Clears [  ]  NPO [  ]    Infant feeding:  Breast [ X ]   Bottle [  ]  Both [X  ]  Feeding related issues and/or concerns:      OBJECTIVE:  T(C): 36.7 (18 @ 01:36), Max: 37.2 (18 @ 21:23)  HR: 95 (18 @ 01:36) (86 - 96)  BP: 105/68 (18 @ 01:36) (100/52 - 116/63)  RR: 18 (18 @ 01:36) (17 - 18)  SpO2: 98% (18 @ 01:36) (98% - 100%)  Wt(kg): --                        11.4   9.94  )-----------( 207      ( 2018 06:30 )             35.3           Blood Type: O Positive    RPR: Negative          MEDICATIONS  (STANDING):  acetaminophen   Tablet. 975 milliGRAM(s) Oral every 6 hours  diphtheria/tetanus/pertussis (acellular) Vaccine (ADAcel) 0.5 milliLiter(s) IntraMuscular once  ferrous    sulfate 325 milliGRAM(s) Oral daily  heparin  Injectable 5000 Unit(s) SubCutaneous every 12 hours  ibuprofen  Tablet 600 milliGRAM(s) Oral every 6 hours  oxyCODONE    IR 5 milliGRAM(s) Oral every 3 hours  prenatal multivitamin 1 Tablet(s) Oral daily    MEDICATIONS  (PRN):  diphenhydrAMINE   Capsule 25 milliGRAM(s) Oral every 6 hours PRN Itching  docusate sodium 100 milliGRAM(s) Oral two times a day PRN Stool Softening  glycerin Suppository - Adult 1 Suppository(s) Rectal at bedtime PRN Constipation  lanolin Ointment 1 Application(s) Topical every 3 hours PRN Sore Nipples  oxyCODONE    IR 5 milliGRAM(s) Oral every 4 hours PRN Severe Pain (7 - 10)  simethicone 80 milliGRAM(s) Chew every 4 hours PRN Gas        ASSESSMENT:    32y     G  2    P   2002      PO Day#  2        Delivery: Primary [  ]    Repeat [ X ]                                         Indication of procedure: Abnormal Fetal Status, Previous C/S in Labor    Condition: Stable    Past Medical History significant for: HPI:      Current Issues:    Breasts:  Soft [x  ]   Engorged [  ]  Nipples:  Abdomen: Soft [ x ]   Distended [  ] Nontender [  ]     Bowel sounds :  Present [  ]  Absent [  ]   Fundus:  Firm [x  ]  Boggy [  ]    Abdominal incision: Clean, Dry and Intact [x  ]  Staples [  ] Steri Strips [X  ] Dermabond [  ] Sutures [  ]    Patient wearing abdominal binder for support.    Vaginal: Lochia:  Heavy [  ]  Moderate [ x ]   Scant [   ]    Extremities: Edema [X  ] Negative Minna's Sign [ X ] Nontender Angel  [ x ] Positive pedal pulses [  ]    Other relevant physical exam findings:      PLAN:    Plan: Increase ambulation, analgesia PRN and pain medication protocol standing oxycodone, ibuprofen and acetaminophen.    Diet: Regular diet    Continue routine post-operative and postpartum care.     Discharge Planning [ x ]    For discharge Today  [    ]    Consults:  Social Work [  ]  Lactation [ x ]  Other [         ]
INTERVAL HPI/OVERNIGHT EVENTS:  32y Female s/p c section under spinal anesthesia with duramorph for post op analgesia on 6/16/18    Vital Signs Last 24 Hrs  T(C): 36.7 (18 Jun 2018 01:36), Max: 37.2 (17 Jun 2018 21:23)  T(F): 98.1 (18 Jun 2018 01:36), Max: 98.9 (17 Jun 2018 21:23)  HR: 95 (18 Jun 2018 01:36) (86 - 96)  BP: 105/68 (18 Jun 2018 01:36) (100/52 - 116/63)  BP(mean): --  RR: 18 (18 Jun 2018 01:36) (17 - 18)  SpO2: 98% (18 Jun 2018 01:36) (98% - 100%)    Patient seen 07:23 today, doing well, no anesthetic complications or complaints noted or reported.  Pain is controlled.    Sitting up in bed.  No issues.
INTERVAL HPI/OVERNIGHT EVENTS:  32y Female s/p c section under spinal anesthesia with duramorph for post op analgesia on 6/16/18    Vital Signs Last 24 Hrs  T(C): 36.7 (18 Jun 2018 01:36), Max: 37.2 (17 Jun 2018 21:23)  T(F): 98.1 (18 Jun 2018 01:36), Max: 98.9 (17 Jun 2018 21:23)  HR: 95 (18 Jun 2018 01:36) (86 - 96)  BP: 105/68 (18 Jun 2018 01:36) (100/52 - 116/63)  BP(mean): --  RR: 18 (18 Jun 2018 01:36) (17 - 18)  SpO2: 98% (18 Jun 2018 01:36) (98% - 100%)    Patient seen 07:26, doing well, no anesthetic complications or complaints noted or reported.  Pain is controlled.    Mobile, fully.  In street clothes.  No issues.
INTERVAL HPI/OVERNIGHT EVENTS:  32y Female s/p c section under spinal anesthesia with duramorph for post op analgesia on 6/17/18    Vital Signs Last 24 Hrs  T(C): 36.7 (18 Jun 2018 01:36), Max: 37.2 (17 Jun 2018 21:23)  T(F): 98.1 (18 Jun 2018 01:36), Max: 98.9 (17 Jun 2018 21:23)  HR: 95 (18 Jun 2018 01:36) (86 - 96)  BP: 105/68 (18 Jun 2018 01:36) (100/52 - 116/63)  BP(mean): --  RR: 18 (18 Jun 2018 01:36) (17 - 18)  SpO2: 98% (18 Jun 2018 01:36) (98% - 100%)    Patient seen 07:21, doing well, no anesthetic complications or complaints noted or reported.  Pain is controlled.    Lying in bed tired.  No sleep last night from noise of hospital activity. No anesthesia issues.
OB Progress Note:  Delivery, POD#1    S: 31yo  POD#1 s/p rLTCS . Her pain is well controlled. She is tolerating a regular diet and passing flatus. Denies N/V. Denies CP/SOB/lightheadedness/dizziness. She has not yet been out of bed.  Indwelling catheter is in place. She is having some diffuse itchiness.     Vital Signs Last 24 Hrs  T(C): 37.3 (2018 01:23), Max: 37.4 (2018 23:30)  T(F): 99.2 (:23), Max: 99.4 (2018 23:30)  HR: 92 (:) (81 - 95)  BP: 125/55 (:) (104/63 - 142/65)  BP(mean): 73 (2018 00:00) (64 - 84)  RR: 17 (:) (15 - 25)  SpO2: 100% (:) (97% - 100%)    Labs:  Blood type: O Positive  Rubella IgG: RPR:                           12.3   12.01<H> >-----------< 253    ( 06-16 @ 15:50 )             37.4                  PE:  General: NAD  Abdomen: Mildly distended, appropriately tender, incision c/d/i.  Extremities: No erythema, no pitting edema      Shayy Magaña, PGY-1
SUBJECTIVE:    Pain: Controlled    Complaints: None    MILESTONES:    Alert and Oriented x 3  [ x ]  Out of bed/ ambulating. [ x ]  Flatus:   Positive [ x ]  Negative [  ]  Bowel movement  [  ] Positive [ x ] Negative   Voiding [x  ] Due to void [  ]   Diet: Regular [ x ]  Clears [  ]  NPO [  ]    Infant feeding:  Breast [  ]   Bottle [  ]  Both [ x ]  Feeding related issues and/or concerns:  pt states she feels like milk not coming in      OBJECTIVE:  T(C): 37.1 (18 @ 05:21), Max: 37.1 (18 @ 17:58)  HR: 87 (18 @ 05:21) (87 - 100)  BP: 110/66 (18 @ 05:21) (110/66 - 119/79)  RR: 17 (18 @ 05:21) (17 - 18)  SpO2: 99% (18 @ 05:21) (99% - 100%)        Blood Type: O Positive  Rubella: Immune  RPR: Negative          MEDICATIONS  (STANDING):  acetaminophen   Tablet. 975 milliGRAM(s) Oral every 6 hours  diphtheria/tetanus/pertussis (acellular) Vaccine (ADAcel) 0.5 milliLiter(s) IntraMuscular once  ferrous    sulfate 325 milliGRAM(s) Oral daily  heparin  Injectable 5000 Unit(s) SubCutaneous every 12 hours  ibuprofen  Tablet 600 milliGRAM(s) Oral every 6 hours  oxyCODONE    IR 5 milliGRAM(s) Oral every 3 hours  prenatal multivitamin 1 Tablet(s) Oral daily    MEDICATIONS  (PRN):  diphenhydrAMINE   Capsule 25 milliGRAM(s) Oral every 6 hours PRN Itching  docusate sodium 100 milliGRAM(s) Oral two times a day PRN Stool Softening  glycerin Suppository - Adult 1 Suppository(s) Rectal at bedtime PRN Constipation  lanolin Ointment 1 Application(s) Topical every 3 hours PRN Sore Nipples  magnesium hydroxide Suspension 30 milliLiter(s) Oral daily PRN Constipation  oxyCODONE    IR 5 milliGRAM(s) Oral every 4 hours PRN Severe Pain (7 - 10)  simethicone 80 milliGRAM(s) Chew every 4 hours PRN Gas        ASSESSMENT:    32y              PO Day# 3         Delivery: Primary [  ]    Repeat [ x ]                                         Indication of procedure: repeat     Condition: Stable    Past Medical History significant for: HPI: inguinal hernia repair 2005    Current Issues: None    Breasts:  Soft [x  ]   Engorged [  ]  Nipples: WNL  Abdomen: Soft [ x ]   Distended [  ] Nontender [  ]   Bowel sounds :  Present [ x ]  Absent [  ]   Fundus:  Firm [x  ]  Boggy [  ]  Abdominal incision: Clean, Dry and Intact [x  ]  Staples [  ] Steri Strips [  ] Dermabond [ x ] Sutures [  ]    Patient wearing abdominal binder for support.    Vaginal: Lochia:  Heavy [  ]  Moderate [  ]   Scant [ x ]  Extremities: Trace edema [ x ] Negative Minna's Sign [ x ] Nontender Angel  [ x ]      Other relevant physical exam findings: none      PLAN:  Increase ambulation, analgesia PRN and pain medication protocol standing oxycodone, ibuprofen and acetaminophen.    Diet: Regular diet    Continue routine post-operative and postpartum care.     Discharge Planning [ x ]    For discharge Today  [  x  ]    Consults:  Social Work [  ]  Lactation [ x ]  Other [         ]
pt seen and evaluated by me without complaints  +breast, pain well controlled  abd: soft/ fundus firm 2cm below umbilicus  inc: clean and dry and intact  ext: non tender, small edema b/l  plan for dc home in am  f/u 2 week wound check

## 2018-06-27 ENCOUNTER — APPOINTMENT (OUTPATIENT)
Dept: OBGYN | Facility: CLINIC | Age: 33
End: 2018-06-27

## 2018-06-29 ENCOUNTER — APPOINTMENT (OUTPATIENT)
Dept: OBGYN | Facility: CLINIC | Age: 33
End: 2018-06-29
Payer: COMMERCIAL

## 2018-06-29 VITALS
HEIGHT: 63 IN | WEIGHT: 179 LBS | BODY MASS INDEX: 31.71 KG/M2 | SYSTOLIC BLOOD PRESSURE: 102 MMHG | DIASTOLIC BLOOD PRESSURE: 70 MMHG

## 2018-06-29 PROCEDURE — 0503F POSTPARTUM CARE VISIT: CPT

## 2018-06-30 ENCOUNTER — APPOINTMENT (OUTPATIENT)
Dept: ULTRASOUND IMAGING | Facility: CLINIC | Age: 33
End: 2018-06-30
Payer: COMMERCIAL

## 2018-06-30 ENCOUNTER — OUTPATIENT (OUTPATIENT)
Dept: OUTPATIENT SERVICES | Facility: HOSPITAL | Age: 33
LOS: 1 days | End: 2018-06-30
Payer: COMMERCIAL

## 2018-06-30 ENCOUNTER — TRANSCRIPTION ENCOUNTER (OUTPATIENT)
Age: 33
End: 2018-06-30

## 2018-06-30 DIAGNOSIS — Z00.8 ENCOUNTER FOR OTHER GENERAL EXAMINATION: ICD-10-CM

## 2018-06-30 PROCEDURE — 93971 EXTREMITY STUDY: CPT | Mod: 26,LT

## 2018-06-30 PROCEDURE — 93971 EXTREMITY STUDY: CPT

## 2018-07-27 ENCOUNTER — APPOINTMENT (OUTPATIENT)
Dept: OBGYN | Facility: CLINIC | Age: 33
End: 2018-07-27
Payer: COMMERCIAL

## 2018-07-27 VITALS
HEIGHT: 63 IN | SYSTOLIC BLOOD PRESSURE: 108 MMHG | BODY MASS INDEX: 32.43 KG/M2 | DIASTOLIC BLOOD PRESSURE: 70 MMHG | WEIGHT: 183 LBS

## 2018-07-27 PROCEDURE — 0503F POSTPARTUM CARE VISIT: CPT

## 2018-11-14 ENCOUNTER — TRANSCRIPTION ENCOUNTER (OUTPATIENT)
Age: 33
End: 2018-11-14

## 2018-11-19 NOTE — DISCHARGE NOTE OB - TEMPERATURE GREATER THAN 100.0  F ORALLY
Initial Anesthesia Post-op Note    Patient: Alix Bolivar  Procedure(s) Performed: RIGHT DISTAL RADIUS OPEN REDUCTION INTERNAL FIXATION  Anesthesia type: Regional Block    Vital Last Value   Temperature 36.6 °C (97.9 °F) (09/05/17 1650)   Pulse 62 (09/05/17 1705)   Respiratory Rate 16 (09/05/17 1705)   Non-Invasive   Blood Pressure 123/80 (09/05/17 1705)   Arterial  Blood Pressure     Pulse Oximetry 95 % (09/05/17 1705)     Last 24 I/O:   Intake/Output Summary (Last 24 hours) at 09/05/17 1714  Last data filed at 09/05/17 1632   Gross per 24 hour   Intake              900 ml   Output                0 ml   Net              900 ml       PATIENT LOCATION: PACU Phase 1  POST-OP VITAL SIGNS: stable  LEVEL OF CONSCIOUSNESS: participates in exam, awake, oriented, alert and answers questions appropriately  RESPIRATORY STATUS: spontaneous ventilation, unassisted and room air  CARDIOVASCULAR: stable and blood pressure returned to baseline  HYDRATION: euvolemic    PAIN MANAGEMENT: adequately controlled  NAUSEA: None  AIRWAY PATENCY: patent  POST-OP ASSESSMENT: patient tolerated procedure well with no complications, sufficiently recovered from acute administration of anesthesia effects and able to participate in evaluation and regional anesthetic effects remain in place; patient otherwise able to participate in evaluation  Comments: Patient received MONAC for the procedure. Natural airway. No respiratory issues. Patient awake, alert, VSS to PACU. Report given to JOVAN Quinones MD  Anesthesiology  HANDOFF:  Handoff to receiving nurse was performed and questions were answered      
Statement Selected
yes

## 2018-12-03 ENCOUNTER — APPOINTMENT (OUTPATIENT)
Dept: OBGYN | Facility: CLINIC | Age: 33
End: 2018-12-03
Payer: COMMERCIAL

## 2018-12-03 VITALS
BODY MASS INDEX: 32.25 KG/M2 | HEIGHT: 63 IN | DIASTOLIC BLOOD PRESSURE: 66 MMHG | WEIGHT: 182 LBS | SYSTOLIC BLOOD PRESSURE: 116 MMHG

## 2018-12-03 PROCEDURE — 99395 PREV VISIT EST AGE 18-39: CPT

## 2018-12-04 LAB — HPV HIGH+LOW RISK DNA PNL CVX: NOT DETECTED

## 2018-12-07 LAB — CYTOLOGY CVX/VAG DOC THIN PREP: NORMAL

## 2018-12-29 ENCOUNTER — TRANSCRIPTION ENCOUNTER (OUTPATIENT)
Age: 33
End: 2018-12-29

## 2019-03-04 ENCOUNTER — APPOINTMENT (OUTPATIENT)
Dept: OBGYN | Facility: CLINIC | Age: 34
End: 2019-03-04

## 2019-06-16 ENCOUNTER — TRANSCRIPTION ENCOUNTER (OUTPATIENT)
Age: 34
End: 2019-06-16

## 2019-09-28 ENCOUNTER — TRANSCRIPTION ENCOUNTER (OUTPATIENT)
Age: 34
End: 2019-09-28

## 2021-04-06 ENCOUNTER — APPOINTMENT (OUTPATIENT)
Dept: FAMILY MEDICINE | Facility: CLINIC | Age: 36
End: 2021-04-06

## 2021-06-08 ENCOUNTER — NON-APPOINTMENT (OUTPATIENT)
Age: 36
End: 2021-06-08

## 2021-06-08 ENCOUNTER — APPOINTMENT (OUTPATIENT)
Dept: OBGYN | Facility: CLINIC | Age: 36
End: 2021-06-08
Payer: COMMERCIAL

## 2021-06-08 VITALS
HEIGHT: 63 IN | SYSTOLIC BLOOD PRESSURE: 132 MMHG | BODY MASS INDEX: 30.48 KG/M2 | WEIGHT: 172 LBS | DIASTOLIC BLOOD PRESSURE: 80 MMHG

## 2021-06-08 DIAGNOSIS — Z36.89 ENCOUNTER FOR OTHER SPECIFIED ANTENATAL SCREENING: ICD-10-CM

## 2021-06-08 DIAGNOSIS — Z34.93 ENCOUNTER FOR SUPERVISION OF NORMAL PREGNANCY, UNSPECIFIED, THIRD TRIMESTER: ICD-10-CM

## 2021-06-08 DIAGNOSIS — O34.219 MATERNAL CARE FOR UNSPECIFIED TYPE SCAR FROM PREVIOUS CESAREAN DELIVERY: ICD-10-CM

## 2021-06-08 PROCEDURE — 99072 ADDL SUPL MATRL&STAF TM PHE: CPT

## 2021-06-08 PROCEDURE — 99395 PREV VISIT EST AGE 18-39: CPT

## 2021-06-08 NOTE — HISTORY OF PRESENT ILLNESS
[N] : Patient does not use contraception [Y] : Patient is sexually active [Monogamous (Male Partner)] : is monogamous with a male partner [Patient refuses STI testing] : Patient refuses STI testing [FreeTextEntry1] : 34 yo presents for annual exam and pap smear.  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  Otherwise, No complaints at this time. [Mammogramdate] : 2016 [BreastSonogramDate] : 2016 [PapSmeardate] : 2018 [LMPDate] : 05/13/2021

## 2021-06-08 NOTE — DISCUSSION/SUMMARY
[FreeTextEntry1] : Follow up in 1 year for annual exam and pap smear or PRN\par Breast Mammogram and Sonogram

## 2021-06-09 LAB — HPV HIGH+LOW RISK DNA PNL CVX: NOT DETECTED

## 2021-06-14 LAB — CYTOLOGY CVX/VAG DOC THIN PREP: NORMAL

## 2021-06-30 NOTE — DISCHARGE NOTE OB - PROVIDER TOKENS
Call primary care provider for follow up after discharge/Activities as tolerated/Low salt diet/Monitor weight daily/Report signs and symptoms to primary care provider
VERONICA:'[id number]'

## 2021-07-20 ENCOUNTER — APPOINTMENT (OUTPATIENT)
Dept: OBGYN | Facility: CLINIC | Age: 36
End: 2021-07-20

## 2021-07-20 ENCOUNTER — LABORATORY RESULT (OUTPATIENT)
Age: 36
End: 2021-07-20

## 2021-07-20 ENCOUNTER — APPOINTMENT (OUTPATIENT)
Dept: FAMILY MEDICINE | Facility: CLINIC | Age: 36
End: 2021-07-20
Payer: COMMERCIAL

## 2021-07-20 ENCOUNTER — TRANSCRIPTION ENCOUNTER (OUTPATIENT)
Age: 36
End: 2021-07-20

## 2021-07-20 VITALS
DIASTOLIC BLOOD PRESSURE: 80 MMHG | SYSTOLIC BLOOD PRESSURE: 126 MMHG | HEART RATE: 76 BPM | BODY MASS INDEX: 30.12 KG/M2 | WEIGHT: 170 LBS | TEMPERATURE: 97.1 F | HEIGHT: 63 IN | OXYGEN SATURATION: 99 %

## 2021-07-20 PROCEDURE — 99214 OFFICE O/P EST MOD 30 MIN: CPT | Mod: 25

## 2021-07-20 PROCEDURE — 36415 COLL VENOUS BLD VENIPUNCTURE: CPT

## 2021-07-20 PROCEDURE — 99072 ADDL SUPL MATRL&STAF TM PHE: CPT

## 2021-07-20 NOTE — HISTORY OF PRESENT ILLNESS
[FreeTextEntry8] : Fatigue ,and mental fogginess , she is feeling anxious and started running exercise program \par She  feels increase fatigue alan after running during day , she is unsure if she depressed or anxious. \par She would like her vitamin levels checked and any blood work that may be attributing to her symptoms \par She is scheduled with THerapist this week , has history  of depression in teenage years and is  concerned re taking medications .

## 2021-07-20 NOTE — PLAN
[FreeTextEntry1] : I have ordered labs ,including vitamin levels and thyroid panel , Discussed that if labs are normal , I would advised continuation with therapy and at PE OV ,she may discuss possible SSRI use if symptoms persist\par Pt agreed and will work on diet ,exercise ,self relaxation techniques and therapy

## 2021-07-21 ENCOUNTER — TRANSCRIPTION ENCOUNTER (OUTPATIENT)
Age: 36
End: 2021-07-21

## 2021-07-21 LAB
25(OH)D3 SERPL-MCNC: 40.6 NG/ML
ALBUMIN SERPL ELPH-MCNC: 4.9 G/DL
ALP BLD-CCNC: 66 U/L
ALT SERPL-CCNC: 18 U/L
ANION GAP SERPL CALC-SCNC: 11 MMOL/L
AST SERPL-CCNC: 15 U/L
BASOPHILS # BLD AUTO: 0.04 K/UL
BASOPHILS NFR BLD AUTO: 0.6 %
BILIRUB SERPL-MCNC: 0.4 MG/DL
BUN SERPL-MCNC: 12 MG/DL
CALCIUM SERPL-MCNC: 9.7 MG/DL
CHLORIDE SERPL-SCNC: 105 MMOL/L
CO2 SERPL-SCNC: 25 MMOL/L
CREAT SERPL-MCNC: 0.77 MG/DL
EOSINOPHIL # BLD AUTO: 0.25 K/UL
EOSINOPHIL NFR BLD AUTO: 3.5 %
FERRITIN SERPL-MCNC: 99 NG/ML
FOLATE SERPL-MCNC: 12.5 NG/ML
GLUCOSE SERPL-MCNC: 102 MG/DL
HCT VFR BLD CALC: 46.2 %
HGB BLD-MCNC: 14.7 G/DL
IMM GRANULOCYTES NFR BLD AUTO: 0.3 %
IRON SATN MFR SERPL: 31 %
IRON SERPL-MCNC: 101 UG/DL
LYMPHOCYTES # BLD AUTO: 1.43 K/UL
LYMPHOCYTES NFR BLD AUTO: 19.9 %
MAN DIFF?: NORMAL
MCHC RBC-ENTMCNC: 29.2 PG
MCHC RBC-ENTMCNC: 31.8 GM/DL
MCV RBC AUTO: 91.7 FL
MONOCYTES # BLD AUTO: 0.44 K/UL
MONOCYTES NFR BLD AUTO: 6.1 %
NEUTROPHILS # BLD AUTO: 5.02 K/UL
NEUTROPHILS NFR BLD AUTO: 69.6 %
PLATELET # BLD AUTO: 285 K/UL
POTASSIUM SERPL-SCNC: 4.6 MMOL/L
PROT SERPL-MCNC: 7.7 G/DL
RBC # BLD: 5.04 M/UL
RBC # FLD: 12.6 %
SODIUM SERPL-SCNC: 140 MMOL/L
T3RU NFR SERPL: 1 TBI
TIBC SERPL-MCNC: 327 UG/DL
TSH SERPL-ACNC: 1.01 UIU/ML
UIBC SERPL-MCNC: 226 UG/DL
VIT B12 SERPL-MCNC: 508 PG/ML
WBC # FLD AUTO: 7.2 K/UL

## 2021-08-04 ENCOUNTER — APPOINTMENT (OUTPATIENT)
Dept: PSYCHIATRY | Facility: CLINIC | Age: 36
End: 2021-08-04
Payer: COMMERCIAL

## 2021-08-04 PROCEDURE — 99205 OFFICE O/P NEW HI 60 MIN: CPT

## 2021-08-12 ENCOUNTER — RX CHANGE (OUTPATIENT)
Age: 36
End: 2021-08-12

## 2021-08-17 ENCOUNTER — APPOINTMENT (OUTPATIENT)
Dept: PSYCHIATRY | Facility: CLINIC | Age: 36
End: 2021-08-17
Payer: COMMERCIAL

## 2021-08-17 PROCEDURE — 99214 OFFICE O/P EST MOD 30 MIN: CPT | Mod: 95

## 2021-09-16 ENCOUNTER — APPOINTMENT (OUTPATIENT)
Dept: PSYCHIATRY | Facility: CLINIC | Age: 36
End: 2021-09-16
Payer: COMMERCIAL

## 2021-09-16 PROCEDURE — 99214 OFFICE O/P EST MOD 30 MIN: CPT | Mod: 95

## 2021-10-02 PROBLEM — R53.83 FATIGUE: Status: ACTIVE | Noted: 2021-07-20

## 2021-10-02 PROBLEM — Z83.438 FAMILY HISTORY OF HYPERLIPIDEMIA: Status: ACTIVE | Noted: 2021-10-02

## 2021-10-02 PROBLEM — Z82.49 FAMILY HISTORY OF HYPERTENSION: Status: ACTIVE | Noted: 2021-10-02

## 2021-10-05 ENCOUNTER — APPOINTMENT (OUTPATIENT)
Dept: FAMILY MEDICINE | Facility: CLINIC | Age: 36
End: 2021-10-05

## 2021-10-05 ENCOUNTER — NON-APPOINTMENT (OUTPATIENT)
Age: 36
End: 2021-10-05

## 2021-10-05 DIAGNOSIS — Z83.438 FAMILY HISTORY OF OTHER DISORDER OF LIPOPROTEIN METABOLISM AND OTHER LIPIDEMIA: ICD-10-CM

## 2021-10-05 DIAGNOSIS — Z81.8 FAMILY HISTORY OF OTHER MENTAL AND BEHAVIORAL DISORDERS: ICD-10-CM

## 2021-10-05 DIAGNOSIS — Z82.49 FAMILY HISTORY OF ISCHEMIC HEART DISEASE AND OTHER DISEASES OF THE CIRCULATORY SYSTEM: ICD-10-CM

## 2021-10-05 DIAGNOSIS — R53.83 OTHER FATIGUE: ICD-10-CM

## 2021-10-05 DIAGNOSIS — Z72.3 LACK OF PHYSICAL EXERCISE: ICD-10-CM

## 2022-01-18 ENCOUNTER — APPOINTMENT (OUTPATIENT)
Dept: PSYCHIATRY | Facility: CLINIC | Age: 37
End: 2022-01-18
Payer: COMMERCIAL

## 2022-01-18 PROCEDURE — 99214 OFFICE O/P EST MOD 30 MIN: CPT | Mod: 95

## 2022-04-06 ENCOUNTER — APPOINTMENT (OUTPATIENT)
Dept: PSYCHIATRY | Facility: CLINIC | Age: 37
End: 2022-04-06
Payer: COMMERCIAL

## 2022-04-06 PROCEDURE — 99214 OFFICE O/P EST MOD 30 MIN: CPT | Mod: 95

## 2022-06-10 ENCOUNTER — APPOINTMENT (OUTPATIENT)
Dept: OBGYN | Facility: CLINIC | Age: 37
End: 2022-06-10

## 2022-08-04 ENCOUNTER — APPOINTMENT (OUTPATIENT)
Dept: PSYCHIATRY | Facility: CLINIC | Age: 37
End: 2022-08-04

## 2022-08-04 DIAGNOSIS — F41.9 ANXIETY DISORDER, UNSPECIFIED: ICD-10-CM

## 2022-08-04 PROCEDURE — 99214 OFFICE O/P EST MOD 30 MIN: CPT | Mod: 95

## 2022-09-16 ENCOUNTER — APPOINTMENT (OUTPATIENT)
Dept: OBGYN | Facility: CLINIC | Age: 37
End: 2022-09-16

## 2022-09-16 VITALS
SYSTOLIC BLOOD PRESSURE: 114 MMHG | BODY MASS INDEX: 35.08 KG/M2 | HEIGHT: 63 IN | DIASTOLIC BLOOD PRESSURE: 70 MMHG | WEIGHT: 198 LBS

## 2022-09-16 DIAGNOSIS — Z01.419 ENCOUNTER FOR GYNECOLOGICAL EXAMINATION (GENERAL) (ROUTINE) W/OUT ABNORMAL FINDINGS: ICD-10-CM

## 2022-09-16 PROCEDURE — 99395 PREV VISIT EST AGE 18-39: CPT

## 2022-09-16 RX ORDER — ALPRAZOLAM 0.25 MG/1
0.25 TABLET ORAL
Qty: 15 | Refills: 0 | Status: DISCONTINUED | COMMUNITY
Start: 2021-07-21 | End: 2022-09-16

## 2022-09-16 NOTE — HISTORY OF PRESENT ILLNESS
[N] : Patient reports normal menses [Y] : Patient is sexually active [Monogamous (Male Partner)] : is monogamous with a male partner [Patient refuses STI testing] : Patient refuses STI testing [FreeTextEntry1] : 35 yo presents for annual exam and pap smear.  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  Referral was given at last visit but she states she has not had time to go.  Otherwise, No complaints at this time. [PapSmeardate] : 6/8/2021 [LMPDate] : 9/1/22

## 2022-09-19 LAB — HPV HIGH+LOW RISK DNA PNL CVX: NOT DETECTED

## 2022-09-23 LAB — CYTOLOGY CVX/VAG DOC THIN PREP: NORMAL

## 2022-09-27 ENCOUNTER — APPOINTMENT (OUTPATIENT)
Dept: ULTRASOUND IMAGING | Facility: IMAGING CENTER | Age: 37
End: 2022-09-27

## 2022-09-27 ENCOUNTER — OUTPATIENT (OUTPATIENT)
Dept: OUTPATIENT SERVICES | Facility: HOSPITAL | Age: 37
LOS: 1 days | End: 2022-09-27
Payer: COMMERCIAL

## 2022-09-27 ENCOUNTER — RESULT REVIEW (OUTPATIENT)
Age: 37
End: 2022-09-27

## 2022-09-27 ENCOUNTER — NON-APPOINTMENT (OUTPATIENT)
Age: 37
End: 2022-09-27

## 2022-09-27 ENCOUNTER — APPOINTMENT (OUTPATIENT)
Dept: MAMMOGRAPHY | Facility: IMAGING CENTER | Age: 37
End: 2022-09-27

## 2022-09-27 DIAGNOSIS — Z00.8 ENCOUNTER FOR OTHER GENERAL EXAMINATION: ICD-10-CM

## 2022-09-27 PROCEDURE — 77067 SCR MAMMO BI INCL CAD: CPT | Mod: 26

## 2022-09-27 PROCEDURE — 76641 ULTRASOUND BREAST COMPLETE: CPT | Mod: 26,50

## 2022-09-27 PROCEDURE — 77067 SCR MAMMO BI INCL CAD: CPT

## 2022-09-27 PROCEDURE — 76641 ULTRASOUND BREAST COMPLETE: CPT

## 2022-09-27 PROCEDURE — 77063 BREAST TOMOSYNTHESIS BI: CPT | Mod: 26

## 2022-09-27 PROCEDURE — 77063 BREAST TOMOSYNTHESIS BI: CPT

## 2022-09-28 ENCOUNTER — NON-APPOINTMENT (OUTPATIENT)
Age: 37
End: 2022-09-28

## 2022-09-28 DIAGNOSIS — Z23 ENCOUNTER FOR IMMUNIZATION: ICD-10-CM

## 2022-10-03 ENCOUNTER — APPOINTMENT (OUTPATIENT)
Dept: ULTRASOUND IMAGING | Facility: IMAGING CENTER | Age: 37
End: 2022-10-03
Payer: COMMERCIAL

## 2022-10-03 ENCOUNTER — RESULT REVIEW (OUTPATIENT)
Age: 37
End: 2022-10-03

## 2022-10-03 ENCOUNTER — OUTPATIENT (OUTPATIENT)
Dept: OUTPATIENT SERVICES | Facility: HOSPITAL | Age: 37
LOS: 1 days | End: 2022-10-03
Payer: COMMERCIAL

## 2022-10-03 DIAGNOSIS — Z00.8 ENCOUNTER FOR OTHER GENERAL EXAMINATION: ICD-10-CM

## 2022-10-03 PROCEDURE — 76642 ULTRASOUND BREAST LIMITED: CPT | Mod: 26,RT

## 2022-10-03 PROCEDURE — 76642 ULTRASOUND BREAST LIMITED: CPT

## 2022-10-05 ENCOUNTER — LABORATORY RESULT (OUTPATIENT)
Age: 37
End: 2022-10-05

## 2022-10-05 ENCOUNTER — APPOINTMENT (OUTPATIENT)
Dept: FAMILY MEDICINE | Facility: CLINIC | Age: 37
End: 2022-10-05

## 2022-10-05 ENCOUNTER — NON-APPOINTMENT (OUTPATIENT)
Age: 37
End: 2022-10-05

## 2022-10-05 VITALS
OXYGEN SATURATION: 99 % | WEIGHT: 202 LBS | HEIGHT: 63 IN | DIASTOLIC BLOOD PRESSURE: 82 MMHG | SYSTOLIC BLOOD PRESSURE: 108 MMHG | TEMPERATURE: 97.3 F | BODY MASS INDEX: 35.79 KG/M2 | HEART RATE: 81 BPM

## 2022-10-05 DIAGNOSIS — M25.50 PAIN IN UNSPECIFIED JOINT: ICD-10-CM

## 2022-10-05 PROCEDURE — 99395 PREV VISIT EST AGE 18-39: CPT | Mod: 25

## 2022-10-05 PROCEDURE — 90686 IIV4 VACC NO PRSV 0.5 ML IM: CPT

## 2022-10-05 PROCEDURE — 36415 COLL VENOUS BLD VENIPUNCTURE: CPT

## 2022-10-05 PROCEDURE — G0008: CPT

## 2022-10-05 PROCEDURE — 93000 ELECTROCARDIOGRAM COMPLETE: CPT

## 2022-10-05 NOTE — REVIEW OF SYSTEMS
[Recent Change In Weight] : ~T recent weight change [Negative] : Neurological [de-identified] : migraines, "brain fog"

## 2022-10-05 NOTE — ASSESSMENT
[FreeTextEntry1] : FERNIE WAITE is a 36 year old female here for a physical exam.\par \par Patient has a history of anxiety, depression, and panic attacks. Her anxiety is significantly improved since starting Lexapro but she has gained a lot of weight. She exercises regularly but admits that she does not watch her diet and has a large appetite. She is willing to decrease Lexapro or switch to another medication but she is concerned that her anxiety was severe before she started this.\par \par She complains of "brain fog", mainly in the summer. She also reports joint pain.

## 2022-10-05 NOTE — HISTORY OF PRESENT ILLNESS
[FreeTextEntry1] : FERNIE WAITE is a 36 year old female here for a physical exam.  [de-identified] : Her last physical exam was 12/2018; she was scheduled for 10/5/21 but no-showed\par \par Vaccines:\par Tetanus is up to date; last 6/20/2018\par COVID vaccine is up to date\par \par Her last dentist visit was one year ago\par Her last eye doctor appointment was 2 years ago\par Her last dermatologist visit was less than one year ago (Dr. Weeks) \par \par GYN visit is up to date\par Her last mammogram was 9/27/22\par \par Her diet is healthy overall but she has a large appetite since she started Lexapro\par Exercise: running

## 2022-10-05 NOTE — PLAN
[FreeTextEntry1] : Continue all medications as prescribed. Check labs as above. Will check TSH, JAGUAR, and RF due to "brain fog" and joint pain. Will adjust any medications based upon lab results.\par \par She would like to try decreasing Lexapro to see if her anxiety is controlled on a lower dose and to see if this helps her to lose weight. She requests an Rx for 10 mg tablets that she can cut to try 15 mg daily first. If she is unable to lose weight, consider adding Wellbutrin and tapering off of Lexapro.\par \par Reviewed age-appropriate preventive screening tests with patient.\par \par Discussed clean eating (e.g. Mediterranean style diet) and recommendations for regular exercise/staying as physically active as possible.\par \par Reviewed importance of good self care (e.g. meditation, yoga, adequate rest, regular exercise, magnesium, clean eating, etc.).\par \par Follow up for next physical in 1-2 years.\par

## 2022-10-05 NOTE — HEALTH RISK ASSESSMENT
[0] : 2) Feeling down, depressed, or hopeless: Not at all (0) [PHQ-2 Negative - No further assessment needed] : PHQ-2 Negative - No further assessment needed [[id number]] : 0

## 2022-10-08 ENCOUNTER — TRANSCRIPTION ENCOUNTER (OUTPATIENT)
Age: 37
End: 2022-10-08

## 2022-10-08 LAB
ALBUMIN SERPL ELPH-MCNC: 4.9 G/DL
ALP BLD-CCNC: 81 U/L
ALT SERPL-CCNC: 32 U/L
ANA PAT FLD IF-IMP: ABNORMAL
ANACR T: ABNORMAL
ANION GAP SERPL CALC-SCNC: 12 MMOL/L
AST SERPL-CCNC: 28 U/L
BASOPHILS # BLD AUTO: 0.04 K/UL
BASOPHILS NFR BLD AUTO: 0.5 %
BILIRUB SERPL-MCNC: 0.3 MG/DL
BUN SERPL-MCNC: 13 MG/DL
CALCIUM SERPL-MCNC: 9.5 MG/DL
CHLORIDE SERPL-SCNC: 102 MMOL/L
CHOLEST SERPL-MCNC: 223 MG/DL
CO2 SERPL-SCNC: 24 MMOL/L
CREAT SERPL-MCNC: 0.71 MG/DL
EGFR: 113 ML/MIN/1.73M2
EOSINOPHIL # BLD AUTO: 0.18 K/UL
EOSINOPHIL NFR BLD AUTO: 2.1 %
GLUCOSE SERPL-MCNC: 83 MG/DL
HCT VFR BLD CALC: 41.5 %
HDLC SERPL-MCNC: 45 MG/DL
HGB BLD-MCNC: 14.3 G/DL
IMM GRANULOCYTES NFR BLD AUTO: 0.2 %
LDLC SERPL CALC-MCNC: 140 MG/DL
LYMPHOCYTES # BLD AUTO: 2.4 K/UL
LYMPHOCYTES NFR BLD AUTO: 28.4 %
MAN DIFF?: NORMAL
MCHC RBC-ENTMCNC: 29.6 PG
MCHC RBC-ENTMCNC: 34.5 GM/DL
MCV RBC AUTO: 85.9 FL
MONOCYTES # BLD AUTO: 0.5 K/UL
MONOCYTES NFR BLD AUTO: 5.9 %
NEUTROPHILS # BLD AUTO: 5.3 K/UL
NEUTROPHILS NFR BLD AUTO: 62.9 %
NONHDLC SERPL-MCNC: 179 MG/DL
PLATELET # BLD AUTO: 280 K/UL
POTASSIUM SERPL-SCNC: 4.1 MMOL/L
PROT SERPL-MCNC: 7.5 G/DL
RBC # BLD: 4.83 M/UL
RBC # FLD: 12.3 %
RHEUMATOID FACT SER QL: <10 IU/ML
SODIUM SERPL-SCNC: 138 MMOL/L
TRIGL SERPL-MCNC: 194 MG/DL
TSH SERPL-ACNC: 1.48 UIU/ML
WBC # FLD AUTO: 8.44 K/UL

## 2022-10-11 ENCOUNTER — TRANSCRIPTION ENCOUNTER (OUTPATIENT)
Age: 37
End: 2022-10-11

## 2022-10-19 ENCOUNTER — RX CHANGE (OUTPATIENT)
Age: 37
End: 2022-10-19

## 2023-04-20 ENCOUNTER — APPOINTMENT (OUTPATIENT)
Dept: ULTRASOUND IMAGING | Facility: IMAGING CENTER | Age: 38
End: 2023-04-20

## 2023-07-13 ENCOUNTER — APPOINTMENT (OUTPATIENT)
Dept: FAMILY MEDICINE | Facility: CLINIC | Age: 38
End: 2023-07-13

## 2023-07-17 ENCOUNTER — APPOINTMENT (OUTPATIENT)
Dept: FAMILY MEDICINE | Facility: CLINIC | Age: 38
End: 2023-07-17
Payer: COMMERCIAL

## 2023-07-17 VITALS
TEMPERATURE: 97 F | SYSTOLIC BLOOD PRESSURE: 132 MMHG | HEIGHT: 63 IN | HEART RATE: 107 BPM | DIASTOLIC BLOOD PRESSURE: 84 MMHG | OXYGEN SATURATION: 99 % | WEIGHT: 201 LBS | BODY MASS INDEX: 35.61 KG/M2

## 2023-07-17 DIAGNOSIS — E66.01 MORBID (SEVERE) OBESITY DUE TO EXCESS CALORIES: ICD-10-CM

## 2023-07-17 PROCEDURE — 99214 OFFICE O/P EST MOD 30 MIN: CPT

## 2023-07-17 RX ORDER — ESCITALOPRAM OXALATE 20 MG/1
20 TABLET ORAL
Qty: 90 | Refills: 3 | Status: ACTIVE | COMMUNITY
Start: 2021-07-21 | End: 1900-01-01

## 2023-07-17 RX ORDER — ESCITALOPRAM OXALATE 5 MG/1
5 TABLET ORAL
Qty: 90 | Refills: 2 | Status: COMPLETED | COMMUNITY
Start: 2022-10-05 | End: 2023-07-17

## 2023-07-17 NOTE — HISTORY OF PRESENT ILLNESS
[FreeTextEntry1] : FERNIE VENTURAAB is a 37 year old female here for a follow up visit.  [de-identified] : Patient has a history of anxiety, depression, and panic attacks. Her anxiety is significantly improved since starting Lexapro but she has gained a lot of weight. She exercises regularly but admits that she does not watch her diet and has a large appetite. \par \par She also complains of "brain fog", mainly in the summer. She also reports joint pain. She had blood work done at her annual physical in 10/2022. Her JAGUAR was borderline high at 1:80 but the rest of her las were normal. \par \par She planned to cut back on her Lexapro dose to see if this still controlled her anxiety and if a lower dose would help with her weight.\par

## 2023-07-17 NOTE — HEALTH RISK ASSESSMENT
[0] : 2) Feeling down, depressed, or hopeless: Not at all (0) [PHQ-2 Negative - No further assessment needed] : PHQ-2 Negative - No further assessment needed [Never] : Never [[id number]] : 0

## 2023-07-17 NOTE — PLAN
[FreeTextEntry1] : See medications as above.\par \par Will refer to medical weight management. I agreed to prescribe Ozempic until she can get an appointment.\par \par Discussed clean eating (e.g. Mediterranean style diet) and recommendations for regular exercise/staying as physically active as possible.\par \par Reviewed importance of good self care (e.g. meditation, yoga, adequate rest, regular exercise, magnesium, clean eating, etc.).\par \par Face-to-face time spent with patient, over half in discussion of the above diagnoses and treatment plan: 30 minutes.\par

## 2023-07-17 NOTE — REVIEW OF SYSTEMS
[Recent Change In Weight] : ~T recent weight change [Negative] : Heme/Lymph [de-identified] : migraines, "brain fog"

## 2023-07-17 NOTE — ASSESSMENT
[FreeTextEntry1] : She is here to discuss her weight. She has a history of anxiety, depression, and panic attacks treated with Lexapro.\par \par We discussed at her physical that if she was still struggling with her weight and wanted to stop Lexapro she could try Wellbutrin instead, though this can sometimes increase anxiety when it is first started. She tried tapering Lexapro down to 15 mg but felt more anxious so she went back to 20 mg. Her mother  unexpectedly 3 weeks ago and she has been having more anxiety and panic attacks since then. She is going to therapy but feels that she should continue Lexapro 20 mg for now. She has also taken Xanax 0.25 mg in the past and would like a refill for this.\par \par In terms of her weight, she is eating less and exercising more but still not losing weight. She checked with her insurance company and Ozempic is covered without a diagnosis of diabetes. She would like a prescription for this though she is not sure she will start it. She would like a referral for medical weight management as well to explore other options.\par \par \par 
unintentional

## 2023-08-23 ENCOUNTER — RX RENEWAL (OUTPATIENT)
Age: 38
End: 2023-08-23

## 2023-10-27 ENCOUNTER — APPOINTMENT (OUTPATIENT)
Dept: FAMILY MEDICINE | Facility: CLINIC | Age: 38
End: 2023-10-27

## 2023-10-27 DIAGNOSIS — F41.0 PANIC DISORDER [EPISODIC PAROXYSMAL ANXIETY]: ICD-10-CM

## 2023-12-04 ENCOUNTER — RX RENEWAL (OUTPATIENT)
Age: 38
End: 2023-12-04

## 2023-12-27 ENCOUNTER — RX RENEWAL (OUTPATIENT)
Age: 38
End: 2023-12-27

## 2024-01-17 ENCOUNTER — RX RENEWAL (OUTPATIENT)
Age: 39
End: 2024-01-17

## 2024-01-23 ENCOUNTER — APPOINTMENT (OUTPATIENT)
Dept: OBGYN | Facility: CLINIC | Age: 39
End: 2024-01-23
Payer: SELF-PAY

## 2024-01-23 VITALS
BODY MASS INDEX: 33.31 KG/M2 | WEIGHT: 188 LBS | DIASTOLIC BLOOD PRESSURE: 74 MMHG | HEIGHT: 63 IN | SYSTOLIC BLOOD PRESSURE: 120 MMHG

## 2024-01-23 DIAGNOSIS — Z01.419 ENCOUNTER FOR GYNECOLOGICAL EXAMINATION (GENERAL) (ROUTINE) W/OUT ABNORMAL FINDINGS: ICD-10-CM

## 2024-01-23 DIAGNOSIS — N63.10 UNSPECIFIED LUMP IN THE RIGHT BREAST, UNSPECIFIED QUADRANT: ICD-10-CM

## 2024-01-23 PROCEDURE — 99395 PREV VISIT EST AGE 18-39: CPT

## 2024-01-23 NOTE — HISTORY OF PRESENT ILLNESS
[Patient refuses STI testing] : Patient refuses STI testing [FreeTextEntry1] : 39 yo presents for annual exam and pap smear.  History of abnormal breast imagining in the past and recommended for early breast cancer screening.  Otherwise, No complaints at this time. [PapSmeardate] : 9/2022 [LMPDate] : 1/9/24

## 2024-01-25 LAB — HPV HIGH+LOW RISK DNA PNL CVX: NOT DETECTED

## 2024-01-30 LAB — CYTOLOGY CVX/VAG DOC THIN PREP: ABNORMAL

## 2024-02-14 ENCOUNTER — RX RENEWAL (OUTPATIENT)
Age: 39
End: 2024-02-14

## 2024-02-18 ENCOUNTER — NON-APPOINTMENT (OUTPATIENT)
Age: 39
End: 2024-02-18

## 2024-02-20 ENCOUNTER — NON-APPOINTMENT (OUTPATIENT)
Age: 39
End: 2024-02-20

## 2024-02-23 ENCOUNTER — OUTPATIENT (OUTPATIENT)
Dept: OUTPATIENT SERVICES | Facility: HOSPITAL | Age: 39
LOS: 1 days | End: 2024-02-23
Payer: COMMERCIAL

## 2024-02-23 ENCOUNTER — APPOINTMENT (OUTPATIENT)
Dept: BARIATRICS/WEIGHT MGMT | Facility: CLINIC | Age: 39
End: 2024-02-23
Payer: COMMERCIAL

## 2024-02-23 VITALS — HEIGHT: 63 IN | WEIGHT: 185 LBS | BODY MASS INDEX: 32.78 KG/M2

## 2024-02-23 DIAGNOSIS — I10 ESSENTIAL (PRIMARY) HYPERTENSION: ICD-10-CM

## 2024-02-23 PROCEDURE — G0463: CPT

## 2024-02-23 PROCEDURE — 99205 OFFICE O/P NEW HI 60 MIN: CPT | Mod: 95

## 2024-02-23 NOTE — HISTORY OF PRESENT ILLNESS
[FreeTextEntry1] : 37 yo woman with obesity, anxiety HLD and strong family hx of CAD and DM2 presents for initial obesity medicine eval.  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 wendys - burger, cookie, fries, buggets, soda 11 AM, entire pint of ice cream, large amounts of calories). would eat even though she felt full- broke cycle with keto diet helped a lot with semaglutide no longer snacks akanksha's bagel with an egg lunch leftover from night before a lot of take-out not an etoh drinker typically drinks seltzer and coffee with vegan creamer  PHYSICAL ACTIVITY: over the past couple of months a little less exercise (not working and doing a lot of studying) still maintains an active lifestyle - on feet a lot of day with kids goes through formal exercise spurts - when classes started fell off of exercise routine previously had tried to do running  SLEEP: reports excellent sleep  generally fatigued  feels she might sleep too much goes to sleep around 10:30, up at 7AM on weekends could sleep 10 hours  STRESS: has some baseline anxiety - lexapro since 2021  Please refer to intake forms for complete weight and related history.

## 2024-02-23 NOTE — ASSESSMENT
[FreeTextEntry1] : BARIATRIC SURGERY HISTORY: none  OBESITY COMORBIDITIES: HLD  ANTI-OBESITY MEDICATIONS: ozempic  OBESITY MEDICATION SIDE EFFECTS: none  Recommend the following: check metabolic labs  whole foods based eating strategy limiting refined carbs and added fats - recommend WFP[employer] or as close as possible given obesity, anxiety and strong CAD and DM2 family hx add fermented foods 1-2 x/day as well work with NP increased physical activity for now as much as possible cont semaglutide for now  rtc in 4-6 weeks.

## 2024-02-29 LAB
25(OH)D3 SERPL-MCNC: 27.3 NG/ML
ALBUMIN SERPL ELPH-MCNC: 4.6 G/DL
ALP BLD-CCNC: 74 U/L
ALT SERPL-CCNC: 38 U/L
ANION GAP SERPL CALC-SCNC: 10 MMOL/L
AST SERPL-CCNC: 22 U/L
BILIRUB SERPL-MCNC: 0.4 MG/DL
BUN SERPL-MCNC: 11 MG/DL
CALCIUM SERPL-MCNC: 9.1 MG/DL
CHLORIDE SERPL-SCNC: 104 MMOL/L
CHOLEST SERPL-MCNC: 193 MG/DL
CO2 SERPL-SCNC: 24 MMOL/L
CREAT SERPL-MCNC: 0.92 MG/DL
CRP SERPL HS-MCNC: 9.18 MG/L
EGFR: 82 ML/MIN/1.73M2
ESTIMATED AVERAGE GLUCOSE: 103 MG/DL
GLUCOSE SERPL-MCNC: 92 MG/DL
HBA1C MFR BLD HPLC: 5.2 %
HDLC SERPL-MCNC: 38 MG/DL
INSULIN SERPL-MCNC: 21.6 UU/ML
LDLC SERPL CALC-MCNC: 127 MG/DL
NONHDLC SERPL-MCNC: 154 MG/DL
POTASSIUM SERPL-SCNC: 5.1 MMOL/L
PROT SERPL-MCNC: 7.4 G/DL
SODIUM SERPL-SCNC: 138 MMOL/L
T3FREE SERPL-MCNC: 2.39 PG/ML
T4 FREE SERPL-MCNC: 1 NG/DL
TRIGL SERPL-MCNC: 152 MG/DL
TSH SERPL-ACNC: 1.08 UIU/ML

## 2024-03-01 LAB — APO LP(A) SERPL-MCNC: 24 NMOL/L

## 2024-03-04 DIAGNOSIS — E66.9 OBESITY, UNSPECIFIED: ICD-10-CM

## 2024-03-11 ENCOUNTER — RX RENEWAL (OUTPATIENT)
Age: 39
End: 2024-03-11

## 2024-03-13 ENCOUNTER — APPOINTMENT (OUTPATIENT)
Dept: ULTRASOUND IMAGING | Facility: IMAGING CENTER | Age: 39
End: 2024-03-13
Payer: COMMERCIAL

## 2024-03-13 ENCOUNTER — RESULT REVIEW (OUTPATIENT)
Age: 39
End: 2024-03-13

## 2024-03-13 ENCOUNTER — OUTPATIENT (OUTPATIENT)
Dept: OUTPATIENT SERVICES | Facility: HOSPITAL | Age: 39
LOS: 1 days | End: 2024-03-13
Payer: COMMERCIAL

## 2024-03-13 ENCOUNTER — APPOINTMENT (OUTPATIENT)
Dept: MAMMOGRAPHY | Facility: IMAGING CENTER | Age: 39
End: 2024-03-13
Payer: COMMERCIAL

## 2024-03-13 DIAGNOSIS — Z00.8 ENCOUNTER FOR OTHER GENERAL EXAMINATION: ICD-10-CM

## 2024-03-13 PROCEDURE — 77063 BREAST TOMOSYNTHESIS BI: CPT | Mod: 26

## 2024-03-13 PROCEDURE — 77063 BREAST TOMOSYNTHESIS BI: CPT

## 2024-03-13 PROCEDURE — 77067 SCR MAMMO BI INCL CAD: CPT

## 2024-03-13 PROCEDURE — 77067 SCR MAMMO BI INCL CAD: CPT | Mod: 26

## 2024-03-13 PROCEDURE — 76641 ULTRASOUND BREAST COMPLETE: CPT | Mod: 26,50

## 2024-03-13 PROCEDURE — 76641 ULTRASOUND BREAST COMPLETE: CPT

## 2024-03-14 ENCOUNTER — NON-APPOINTMENT (OUTPATIENT)
Age: 39
End: 2024-03-14

## 2024-03-14 DIAGNOSIS — N63.0 UNSPECIFIED LUMP IN UNSPECIFIED BREAST: ICD-10-CM

## 2024-03-18 ENCOUNTER — OUTPATIENT (OUTPATIENT)
Dept: OUTPATIENT SERVICES | Facility: HOSPITAL | Age: 39
LOS: 1 days | End: 2024-03-18
Payer: COMMERCIAL

## 2024-03-18 ENCOUNTER — APPOINTMENT (OUTPATIENT)
Dept: BARIATRICS/WEIGHT MGMT | Facility: CLINIC | Age: 39
End: 2024-03-18
Payer: COMMERCIAL

## 2024-03-18 DIAGNOSIS — I10 ESSENTIAL (PRIMARY) HYPERTENSION: ICD-10-CM

## 2024-03-18 PROCEDURE — 99213 OFFICE O/P EST LOW 20 MIN: CPT | Mod: 95

## 2024-03-18 PROCEDURE — G0463: CPT

## 2024-03-19 VITALS — WEIGHT: 178 LBS | BODY MASS INDEX: 31.54 KG/M2 | HEIGHT: 63 IN

## 2024-03-19 NOTE — HISTORY OF PRESENT ILLNESS
[FreeTextEntry1] : 37 yo woman with obesity, anxiety HLD and strong family hx of CAD and DM2 presents for obesity medicine follow up.   recommended by PCP struglgling to lose weight has lost a little since the summer but had reached peak max weight 2/2023: 212 lost 15 lbs since  lost the rest with keto but also distress with mother's death prior to going on ozempic  Interim hx: -pt new to this provider/reviewed history in chart and with patient.  -has been eating predominantly plant based for past 4 weeks -down 7 lbs, current 178. Reports feels better, happy with how she feels so far.  -Remains on Ozempic 2mg weekly -had labs drawn, reviewed results. Elevated insulin and CRP levels. Discussed reduction of inflammation with diet change.  -Trying new recipes, using forks over knives as resource as well as pinterest.  -eating some fermented foods, pickled cabbage from [employer], whole wheat bread. Drinking smoothies in AM, oats or avacado toast.  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    Pt doing very well and remains highly motivated.

## 2024-03-19 NOTE — REASON FOR VISIT
[Follow-Up] : a follow-up visit [FreeTextEntry1] : obesity [Home] : at home, [unfilled] , at the time of the visit. [Medical Office: ([facility])___] : at the medical office located in  [Patient] : the patient

## 2024-03-19 NOTE — ASSESSMENT
[FreeTextEntry1] : BARIATRIC SURGERY HISTORY: none  OBESITY COMORBIDITIES: HLD  ANTI-OBESITY MEDICATIONS: ozempic  OBESITY MEDICATION SIDE EFFECTS: none  Recommend the following: Reviewed metabolic labs  continue whole foods based eating strategy limiting refined carbs and added fats - recommend WFPB or as close as possible given obesity, anxiety and strong CAD and DM2 family hx Will send nutrition/recipe resources.  Would take Vit B 12 supplement QOD increased physical activity for now as much as possible Continue Ozempic 2mg  rtc in 3 weeks w/Dr Rodriguez and 6-8 weeks with me.

## 2024-03-25 DIAGNOSIS — E66.9 OBESITY, UNSPECIFIED: ICD-10-CM

## 2024-04-05 ENCOUNTER — OUTPATIENT (OUTPATIENT)
Dept: OUTPATIENT SERVICES | Facility: HOSPITAL | Age: 39
LOS: 1 days | End: 2024-04-05
Payer: COMMERCIAL

## 2024-04-05 ENCOUNTER — APPOINTMENT (OUTPATIENT)
Dept: BARIATRICS/WEIGHT MGMT | Facility: CLINIC | Age: 39
End: 2024-04-05
Payer: COMMERCIAL

## 2024-04-05 DIAGNOSIS — I10 ESSENTIAL (PRIMARY) HYPERTENSION: ICD-10-CM

## 2024-04-05 PROCEDURE — G0463: CPT

## 2024-04-05 PROCEDURE — 99213 OFFICE O/P EST LOW 20 MIN: CPT | Mod: 95

## 2024-04-06 NOTE — HISTORY OF PRESENT ILLNESS
Detail Level: Zone
[FreeTextEntry1] : 39 yo woman with obesity, anxiety HLD and strong family hx of CAD and DM2 presents for initial obesity medicine eval.  recommended by PCP struglgling to lose weight has lost a little since the summer but had reached peak max weight 2/2023: 212 current weight = 178 lbs (24 lb weight loss from max) eating mainly whole foods and plant based but some stressful issues in the past week and has been struggling to maintain food quality and also issues with appetite during that period.  Otherwise without new complaints today.

## 2024-04-06 NOTE — ASSESSMENT
[FreeTextEntry1] : BARIATRIC SURGERY HISTORY: none  OBESITY COMORBIDITIES: HLD  ANTI-OBESITY MEDICATIONS: ozempic  OBESITY MEDICATION SIDE EFFECTS: none  Recommend the following:  whole foods based eating strategy limiting refined carbs and added fats - recommend WFPB or as close as possible given obesity, anxiety and strong CAD and DM2 family hx add fermented foods 1-2 x/day as well cont to work with NP increased physical activity for now as much as possible cont semaglutide for now discussed mindful approaches to stress and food choices/prep  rtc in 4-6 weeks.

## 2024-04-15 DIAGNOSIS — E66.9 OBESITY, UNSPECIFIED: ICD-10-CM

## 2024-04-18 ENCOUNTER — RX RENEWAL (OUTPATIENT)
Age: 39
End: 2024-04-18

## 2024-05-06 ENCOUNTER — OUTPATIENT (OUTPATIENT)
Dept: OUTPATIENT SERVICES | Facility: HOSPITAL | Age: 39
LOS: 1 days | End: 2024-05-06
Payer: COMMERCIAL

## 2024-05-06 ENCOUNTER — APPOINTMENT (OUTPATIENT)
Dept: BARIATRICS/WEIGHT MGMT | Facility: CLINIC | Age: 39
End: 2024-05-06
Payer: COMMERCIAL

## 2024-05-06 DIAGNOSIS — I10 ESSENTIAL (PRIMARY) HYPERTENSION: ICD-10-CM

## 2024-05-06 PROCEDURE — G0463: CPT

## 2024-05-06 PROCEDURE — 99213 OFFICE O/P EST LOW 20 MIN: CPT | Mod: 95

## 2024-05-06 NOTE — ASSESSMENT
[FreeTextEntry1] : BARIATRIC SURGERY HISTORY: none  OBESITY COMORBIDITIES: HLD  ANTI-OBESITY MEDICATIONS: ozempic  OBESITY MEDICATION SIDE EFFECTS: none  Recommend the following: continue whole foods based eating strategy limiting refined carbs and added fats.  Can Vit B 12 supplement QOD Focus on meal prep, trying new recipes, building healthy habits with time.  Stress/time management. School ending soon will be easier to focus on health promoting behaviors.  Optimize sleep 7-8 hours/night.  increased physical activity for now as much as possible.  Continue Ozempic 2mg  rtc in 3 weeks w/Dr Rodriguez and 6-8 weeks with me.

## 2024-05-06 NOTE — REASON FOR VISIT
[Follow-Up] : a follow-up visit [Home] : at home, [unfilled] , at the time of the visit. [Medical Office: ([facility])___] : at the medical office located in  [Patient] : the patient [FreeTextEntry1] : obesity

## 2024-05-06 NOTE — HISTORY OF PRESENT ILLNESS
[FreeTextEntry1] : 39 yo woman with obesity, anxiety HLD and strong family hx of CAD and DM2 presents for obesity medicine follow up.   recommended by PCP struglgling to lose weight has lost a little since the summer but had reached peak max weight 2/2023: 212 lost 15 lbs since  lost the rest with keto but also distress with mother's death prior to going on ozempic  Interim hx: -Remains on Ozempic 2mg weekly. Felt cravings coming back the last month -reports last month has been a struggle with healthy eating. Time management, celebrations getting in way at times.  -seeking out more comfort food, not following strict vegan diet recently. Feels anxiety contributing at times.  -sleep has been disrupted some due to having new puppy -readying for finals now, will have more free time on her hands this summer.  -discussed not letting accomplishing perfection derail her and to focus on the why of improving health.  -discussed building healthy habits takes time and patience.   Pt remains motivated at this time.

## 2024-05-13 DIAGNOSIS — E66.9 OBESITY, UNSPECIFIED: ICD-10-CM

## 2024-05-13 DIAGNOSIS — E78.2 MIXED HYPERLIPIDEMIA: ICD-10-CM

## 2024-05-28 ENCOUNTER — RX RENEWAL (OUTPATIENT)
Age: 39
End: 2024-05-28

## 2024-05-31 ENCOUNTER — OUTPATIENT (OUTPATIENT)
Dept: OUTPATIENT SERVICES | Facility: HOSPITAL | Age: 39
LOS: 1 days | End: 2024-05-31
Payer: COMMERCIAL

## 2024-05-31 ENCOUNTER — APPOINTMENT (OUTPATIENT)
Dept: BARIATRICS/WEIGHT MGMT | Facility: CLINIC | Age: 39
End: 2024-05-31
Payer: COMMERCIAL

## 2024-05-31 DIAGNOSIS — I10 ESSENTIAL (PRIMARY) HYPERTENSION: ICD-10-CM

## 2024-05-31 PROCEDURE — G0463: CPT

## 2024-05-31 PROCEDURE — 99213 OFFICE O/P EST LOW 20 MIN: CPT | Mod: 95

## 2024-05-31 NOTE — HISTORY OF PRESENT ILLNESS
[FreeTextEntry1] : 37 yo woman with obesity, anxiety HLD and strong family hx of CAD and DM2 presents for initial obesity medicine eval.  recommended by PCP continues struggling to lose weight has lost a little since the summer but had reached peak max weight 2/2023: 212 current weight = 178 lbs (34 lb weight loss from max but unchanged over past several months) was eating mainly whole foods and plants but having a difficult time sustaining this feels that stress and schedule often get in the way of her planning and then eats more comfort or convenient food   Otherwise without new complaints today.

## 2024-05-31 NOTE — ASSESSMENT
[FreeTextEntry1] : BARIATRIC SURGERY HISTORY: none  OBESITY COMORBIDITIES: HLD  ANTI-OBESITY MEDICATIONS: ozempic  OBESITY MEDICATION SIDE EFFECTS: none  Recommend the following:  whole foods based eating strategy limiting refined carbs and added fats - recommend WFPB or as close as possible given obesity, anxiety and strong CAD and DM2 family hx add fermented foods 1-2 x/day as well cont semaglutide 2mg can consider change to tirzepatide but has some lilfestyle and planning issues that she should try to remedy will engage in goal-directed moderate intensity exercise (such as planning a 5k race and training for it) back to food prep  rtc in 4-6 weeks.

## 2024-06-05 ENCOUNTER — APPOINTMENT (OUTPATIENT)
Dept: FAMILY MEDICINE | Facility: CLINIC | Age: 39
End: 2024-06-05
Payer: COMMERCIAL

## 2024-06-05 ENCOUNTER — NON-APPOINTMENT (OUTPATIENT)
Age: 39
End: 2024-06-05

## 2024-06-05 VITALS
HEART RATE: 101 BPM | BODY MASS INDEX: 32.43 KG/M2 | SYSTOLIC BLOOD PRESSURE: 122 MMHG | WEIGHT: 183 LBS | TEMPERATURE: 98.7 F | HEIGHT: 63 IN | OXYGEN SATURATION: 97 % | DIASTOLIC BLOOD PRESSURE: 62 MMHG

## 2024-06-05 DIAGNOSIS — F41.8 OTHER SPECIFIED ANXIETY DISORDERS: ICD-10-CM

## 2024-06-05 DIAGNOSIS — Z01.84 ENCOUNTER FOR ANTIBODY RESPONSE EXAMINATION: ICD-10-CM

## 2024-06-05 DIAGNOSIS — Z11.1 ENCOUNTER FOR SCREENING FOR RESPIRATORY TUBERCULOSIS: ICD-10-CM

## 2024-06-05 DIAGNOSIS — Z00.00 ENCOUNTER FOR GENERAL ADULT MEDICAL EXAMINATION W/OUT ABNORMAL FINDINGS: ICD-10-CM

## 2024-06-05 PROCEDURE — 36415 COLL VENOUS BLD VENIPUNCTURE: CPT

## 2024-06-05 PROCEDURE — 99395 PREV VISIT EST AGE 18-39: CPT

## 2024-06-05 PROCEDURE — 93000 ELECTROCARDIOGRAM COMPLETE: CPT

## 2024-06-05 RX ORDER — SEMAGLUTIDE 1.34 MG/ML
4 INJECTION, SOLUTION SUBCUTANEOUS WEEKLY
Qty: 1 | Refills: 0 | Status: DISCONTINUED | COMMUNITY
Start: 2023-07-17 | End: 2024-06-05

## 2024-06-05 RX ORDER — ALPRAZOLAM 0.25 MG/1
0.25 TABLET ORAL
Qty: 30 | Refills: 0 | Status: DISCONTINUED | COMMUNITY
Start: 2023-07-17 | End: 2024-06-05

## 2024-06-05 NOTE — ASSESSMENT
[FreeTextEntry1] : Patient is a 37yo female with PMH HLD, obesity, anxiety who presents to the office for CPE.  Health Maintenance - Due for eye exam, recommended today. - Due for dental exam, recommended today. - Fasting labs drawn in office. - EKG performed in office NSR, no acute ST/T changes, no arrhythmias.  Pt denies cardiac complaints. - Eat plenty of fruits and vegetables, especially deeply colored fruits/vegetables (such as leafy greens, peaches) that are more nutrient-dense.  Continue to work hard on diet and exercise, limiting/avoiding saturated fat, fatty foods, greasy foods, red meats, white flour-based carbohydrates (cookies, cakes, white bread, white rice), and added sugars.  Chose whole grain foods and products made with whole grains over refined grains and white flour-based carbohydrates.  Avoid beverages and food with added sugar.  Limit salt intake to improve blood pressure.  Limit alcohol intake. - Try and incorporate 30 minutes of aerobic exercise to your daily routine.  Anxiety/depression - Feels well/stable on current dose of Lexapro 20mg daily. - Pt interested in weaning down/off Lexapro but not at this time during school.  Will let office know when she's ready to wean down for guidance. - No longer takes Xanax PRN. - Alert office if symptoms persist/worsen.  Paperwork for nursing school partially completed, pending titers and Quantiferon.  Given to RN team.  Call the office or go to the ED immediately if you develop new, worsening or concerning symptoms including high fever, severe headache/worst headache of your life, confusion, dizziness/lightheadedness, loss of consciousness, severe chest pain, difficulty breathing, shortness of breath, severe abdominal pain, excessive vomiting/diarrhea, inability to feel/move the extremities, or any other concerning symptoms.

## 2024-06-05 NOTE — HISTORY OF PRESENT ILLNESS
[FreeTextEntry1] : CPE. [de-identified] : Patient is a 37yo female with PMH HLD, obesity, anxiety who presents to the office for CPE.    Last CPE:  10/05/2022, Dr. WILBERT Rosales.  GYN Exam:  01/2024, Dr. Turner; Pap ASCUS, HPV negative.  Recommended rpt in 1 year.  Mammogram:  03/2024, BIRADS-2.  Recommended breast MRI.  Hx left biopsy in 2016 which revealed atypical hyperplasia.  Colonoscopy:  no known family history of colon cancer.  Ophthalmology:  due, recommended today; wears corrective lenses. Dermatology:  UTD. Dentist:  due, recommended today. Flu:  fall 2023. Tdap:  06/2018.  COVID:  UTD. LMP:  5/9/24, menstruating regularly every 32-33 days, not on birth control. STOP BANG negative.  Obesity Medicine:  Dr. Rodriguez.  Pt currently taking Ozempic for weight loss.   Pt starting clinicals for nursing school soon, needs MMR/varicella/Hep B titers and Quantiferon screening.

## 2024-06-05 NOTE — HEALTH RISK ASSESSMENT
[Yes] : Yes [2 - 4 times a month (2 pts)] : 2-4 times a month (2 points) [1 or 2 (0 pts)] : 1 or 2 (0 points) [Never (0 pts)] : Never (0 points) [No] : In the past 12 months have you used drugs other than those required for medical reasons? No [No falls in past year] : Patient reported no falls in the past year [0] : 2) Feeling down, depressed, or hopeless: Not at all (0) [PHQ-2 Negative - No further assessment needed] : PHQ-2 Negative - No further assessment needed [Former] : Former [0-4] : 0-4 [> 15 Years] : > 15 Years [HIV test declined] : HIV test declined [Hepatitis C test declined] : Hepatitis C test declined [None] : None [With Family] : lives with family [Student] : student [] :  [# Of Children ___] : has [unfilled] children [Sexually Active] : sexually active [Feels Safe at Home] : Feels safe at home [Fully functional (bathing, dressing, toileting, transferring, walking, feeding)] : Fully functional (bathing, dressing, toileting, transferring, walking, feeding) [Fully functional (using the telephone, shopping, preparing meals, housekeeping, doing laundry, using] : Fully functional and needs no help or supervision to perform IADLs (using the telephone, shopping, preparing meals, housekeeping, doing laundry, using transportation, managing medications and managing finances) [With Patient/Caregiver] : , with patient/caregiver [Reviewed no changes] : Reviewed, no changes [Designated Healthcare Proxy] : Designated healthcare proxy [Name: ___] : Health Care Proxy's Name: [unfilled]  [Relationship: ___] : Relationship: [unfilled] [I will adhere to the patient's wishes.] : I will adhere to the patient's wishes. [Audit-CScore] : 2 [de-identified] : not exercising regularly but plans to pick back up soon [de-identified] : well balanced, much improved over the past year. 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

## 2024-06-06 ENCOUNTER — TRANSCRIPTION ENCOUNTER (OUTPATIENT)
Age: 39
End: 2024-06-06

## 2024-06-06 LAB
25(OH)D3 SERPL-MCNC: 28.2 NG/ML
ALBUMIN SERPL ELPH-MCNC: 4.8 G/DL
ALP BLD-CCNC: 74 U/L
ALT SERPL-CCNC: 29 U/L
ANION GAP SERPL CALC-SCNC: 13 MMOL/L
AST SERPL-CCNC: 20 U/L
BASOPHILS # BLD AUTO: 0.05 K/UL
BASOPHILS NFR BLD AUTO: 0.7 %
BILIRUB SERPL-MCNC: 0.4 MG/DL
BUN SERPL-MCNC: 17 MG/DL
CALCIUM SERPL-MCNC: 9.3 MG/DL
CHLORIDE SERPL-SCNC: 103 MMOL/L
CHOLEST SERPL-MCNC: 208 MG/DL
CO2 SERPL-SCNC: 23 MMOL/L
CREAT SERPL-MCNC: 0.75 MG/DL
EGFR: 104 ML/MIN/1.73M2
EOSINOPHIL # BLD AUTO: 0.15 K/UL
EOSINOPHIL NFR BLD AUTO: 2.2 %
ESTIMATED AVERAGE GLUCOSE: 103 MG/DL
GLUCOSE SERPL-MCNC: 86 MG/DL
HBA1C MFR BLD HPLC: 5.2 %
HBV SURFACE AB SERPL IA-ACNC: >1000 MIU/ML
HCT VFR BLD CALC: 43.9 %
HDLC SERPL-MCNC: 46 MG/DL
HGB BLD-MCNC: 14.5 G/DL
IMM GRANULOCYTES NFR BLD AUTO: 0.4 %
LDLC SERPL CALC-MCNC: 140 MG/DL
LYMPHOCYTES # BLD AUTO: 1.92 K/UL
LYMPHOCYTES NFR BLD AUTO: 28.1 %
MAN DIFF?: NORMAL
MCHC RBC-ENTMCNC: 28.5 PG
MCHC RBC-ENTMCNC: 33 GM/DL
MCV RBC AUTO: 86.4 FL
MEV IGG FLD QL IA: >300 AU/ML
MEV IGG+IGM SER-IMP: POSITIVE
MONOCYTES # BLD AUTO: 0.39 K/UL
MONOCYTES NFR BLD AUTO: 5.7 %
MUV AB SER-ACNC: POSITIVE
MUV IGG SER QL IA: >300 AU/ML
NEUTROPHILS # BLD AUTO: 4.3 K/UL
NEUTROPHILS NFR BLD AUTO: 62.9 %
NONHDLC SERPL-MCNC: 162 MG/DL
PLATELET # BLD AUTO: 276 K/UL
POTASSIUM SERPL-SCNC: 4.5 MMOL/L
PROT SERPL-MCNC: 7.6 G/DL
RBC # BLD: 5.08 M/UL
RBC # FLD: 13.1 %
RUBV IGG FLD-ACNC: 12.6 INDEX
RUBV IGG SER-IMP: POSITIVE
SODIUM SERPL-SCNC: 139 MMOL/L
TRIGL SERPL-MCNC: 122 MG/DL
TSH SERPL-ACNC: 1.32 UIU/ML
VZV AB TITR SER: POSITIVE
VZV IGG SER IF-ACNC: 2512 INDEX
WBC # FLD AUTO: 6.84 K/UL

## 2024-06-10 ENCOUNTER — TRANSCRIPTION ENCOUNTER (OUTPATIENT)
Age: 39
End: 2024-06-10

## 2024-06-10 DIAGNOSIS — E66.9 OBESITY, UNSPECIFIED: ICD-10-CM

## 2024-06-10 LAB
M TB IFN-G BLD-IMP: NEGATIVE
QUANTIFERON TB PLUS MITOGEN MINUS NIL: 6.62 IU/ML
QUANTIFERON TB PLUS NIL: 0.04 IU/ML
QUANTIFERON TB PLUS TB1 MINUS NIL: 0 IU/ML
QUANTIFERON TB PLUS TB2 MINUS NIL: 0 IU/ML

## 2024-06-12 ENCOUNTER — TRANSCRIPTION ENCOUNTER (OUTPATIENT)
Age: 39
End: 2024-06-12

## 2024-06-12 ENCOUNTER — NON-APPOINTMENT (OUTPATIENT)
Age: 39
End: 2024-06-12

## 2024-06-24 ENCOUNTER — APPOINTMENT (OUTPATIENT)
Dept: BARIATRICS/WEIGHT MGMT | Facility: CLINIC | Age: 39
End: 2024-06-24
Payer: COMMERCIAL

## 2024-06-24 ENCOUNTER — RX RENEWAL (OUTPATIENT)
Age: 39
End: 2024-06-24

## 2024-06-24 ENCOUNTER — OUTPATIENT (OUTPATIENT)
Dept: OUTPATIENT SERVICES | Facility: HOSPITAL | Age: 39
LOS: 1 days | End: 2024-06-24
Payer: COMMERCIAL

## 2024-06-24 VITALS — BODY MASS INDEX: 31.54 KG/M2 | WEIGHT: 178 LBS | HEIGHT: 63 IN

## 2024-06-24 DIAGNOSIS — E66.9 OBESITY, UNSPECIFIED: ICD-10-CM

## 2024-06-24 DIAGNOSIS — E78.2 MIXED HYPERLIPIDEMIA: ICD-10-CM

## 2024-06-24 DIAGNOSIS — I10 ESSENTIAL (PRIMARY) HYPERTENSION: ICD-10-CM

## 2024-06-24 PROCEDURE — 99213 OFFICE O/P EST LOW 20 MIN: CPT | Mod: 95

## 2024-06-24 PROCEDURE — G0463: CPT

## 2024-06-24 RX ORDER — SEMAGLUTIDE 2.68 MG/ML
8 INJECTION, SOLUTION SUBCUTANEOUS
Qty: 3 | Refills: 0 | Status: ACTIVE | COMMUNITY
Start: 2023-12-04 | End: 1900-01-01

## 2024-07-01 DIAGNOSIS — E78.2 MIXED HYPERLIPIDEMIA: ICD-10-CM

## 2024-07-01 DIAGNOSIS — E66.9 OBESITY, UNSPECIFIED: ICD-10-CM

## 2024-08-07 ENCOUNTER — APPOINTMENT (OUTPATIENT)
Dept: BARIATRICS/WEIGHT MGMT | Facility: CLINIC | Age: 39
End: 2024-08-07

## 2024-08-07 ENCOUNTER — RX RENEWAL (OUTPATIENT)
Age: 39
End: 2024-08-07

## 2024-08-07 ENCOUNTER — OUTPATIENT (OUTPATIENT)
Dept: OUTPATIENT SERVICES | Facility: HOSPITAL | Age: 39
LOS: 1 days | End: 2024-08-07
Payer: COMMERCIAL

## 2024-08-07 DIAGNOSIS — I10 ESSENTIAL (PRIMARY) HYPERTENSION: ICD-10-CM

## 2024-08-07 PROCEDURE — G2211 COMPLEX E/M VISIT ADD ON: CPT | Mod: NC,95

## 2024-08-07 PROCEDURE — 99213 OFFICE O/P EST LOW 20 MIN: CPT | Mod: 95

## 2024-08-07 PROCEDURE — G0463: CPT

## 2024-08-07 NOTE — HISTORY OF PRESENT ILLNESS
[FreeTextEntry1] : 37 yo woman with obesity, anxiety HLD and strong family hx of CAD and DM2 presents for initial obesity medicine eval.  has been exercising all summer running again and joined [employer] weight 2/2023: 212 current weight = 176 lbs (36 lb weight loss from max but only 2 lb weight loss over past few months) feels she is eating ok generally though hungry often she remains on ozempic 2mg she is disappointed that she only lost 2 lbs in spite of exercise efforts she dose report substantial improvement in mood (noticeable by her and her family) since starting back with exercise  Otherwise without new complaints today.

## 2024-08-07 NOTE — HISTORY OF PRESENT ILLNESS
[FreeTextEntry1] : 39 yo woman with obesity, anxiety HLD and strong family hx of CAD and DM2 presents for initial obesity medicine eval.  has been exercising all summer running again and joined [employer] weight 2/2023: 212 current weight = 176 lbs (36 lb weight loss from max but only 2 lb weight loss over past few months) feels she is eating ok generally though hungry often she remains on ozempic 2mg she is disappointed that she only lost 2 lbs in spite of exercise efforts she dose report substantial improvement in mood (noticeable by her and her family) since starting back with exercise  Otherwise without new complaints today. < 75% for >/= 1 month <-------- Click here to INCLUDE CoVID-19 Discharge Instructions

## 2024-08-07 NOTE — ASSESSMENT
[FreeTextEntry1] : BARIATRIC SURGERY HISTORY: none  OBESITY COMORBIDITIES: HLD  ANTI-OBESITY MEDICATIONS: ozempic  OBESITY MEDICATION SIDE EFFECTS: none  Recommend the following:  whole foods based eating strategy limiting refined carbs and added fats - recommend WFPB or as close as possible given obesity, anxiety and strong CAD and DM2 family hx add fermented foods 1-2 x/day as well replace semaglutide with tirzepatide 5mg cont with added intense exercise  rtc in 6 weeks.

## 2024-08-09 ENCOUNTER — RX CHANGE (OUTPATIENT)
Age: 39
End: 2024-08-09

## 2024-08-12 ENCOUNTER — RX RENEWAL (OUTPATIENT)
Age: 39
End: 2024-08-12

## 2024-08-12 ENCOUNTER — RX CHANGE (OUTPATIENT)
Age: 39
End: 2024-08-12

## 2024-08-12 DIAGNOSIS — E11.9 TYPE 2 DIABETES MELLITUS W/OUT COMPLICATIONS: ICD-10-CM

## 2024-08-12 DIAGNOSIS — E66.9 OBESITY, UNSPECIFIED: ICD-10-CM

## 2024-08-13 ENCOUNTER — RX CHANGE (OUTPATIENT)
Age: 39
End: 2024-08-13

## 2024-08-26 ENCOUNTER — APPOINTMENT (OUTPATIENT)
Dept: BARIATRICS/WEIGHT MGMT | Facility: CLINIC | Age: 39
End: 2024-08-26
Payer: COMMERCIAL

## 2024-08-26 ENCOUNTER — OUTPATIENT (OUTPATIENT)
Dept: OUTPATIENT SERVICES | Facility: HOSPITAL | Age: 39
LOS: 1 days | End: 2024-08-26
Payer: COMMERCIAL

## 2024-08-26 VITALS — WEIGHT: 175 LBS | BODY MASS INDEX: 31.01 KG/M2 | HEIGHT: 63 IN

## 2024-08-26 DIAGNOSIS — E11.9 TYPE 2 DIABETES MELLITUS W/OUT COMPLICATIONS: ICD-10-CM

## 2024-08-26 DIAGNOSIS — I10 ESSENTIAL (PRIMARY) HYPERTENSION: ICD-10-CM

## 2024-08-26 DIAGNOSIS — E66.9 OBESITY, UNSPECIFIED: ICD-10-CM

## 2024-08-26 PROCEDURE — G0463: CPT

## 2024-08-26 PROCEDURE — G2211 COMPLEX E/M VISIT ADD ON: CPT | Mod: NC,95

## 2024-08-26 PROCEDURE — 99214 OFFICE O/P EST MOD 30 MIN: CPT | Mod: 95

## 2024-08-26 NOTE — REASON FOR VISIT
[Follow-Up] : a follow-up visit [Home] : at home, [unfilled] , at the time of the visit. [Medical Office: ([facility])___] : at the medical office located in  [Patient] : the patient [FreeTextEntry1] : obesity

## 2024-08-26 NOTE — HISTORY OF PRESENT ILLNESS
[FreeTextEntry1] : 37 yo woman with obesity, anxiety HLD and strong family hx of CAD and DM2 presents for obesity medicine follow up.   recommended by PCP struglgling to lose weight has lost a little since the summer but had reached peak max weight 2/2023: 212 lost 15 lbs since  lost the rest with keto but also distress with mother's death prior to going on ozempic  Interim hx: -Recently switched to Mounjaro 5mg weekly, no side effects. Feels appetite around the same -on vacation w/family on [facility] -Has been going to [employer]/running 5 days/week. Feels great doing this -hasn't seen much of a change in the scale despite exercise and maintaining healthy eating however feels maybe some muscle building  -diet stable. Making better food choices in general.  -going back to school next week -mood improved w/exercise.   Pt remains motivated at this time.

## 2024-08-26 NOTE — ASSESSMENT
[FreeTextEntry1] : BARIATRIC SURGERY HISTORY: none  OBESITY COMORBIDITIES: HLD  ANTI-OBESITY MEDICATIONS: ozempic, mounjaro  OBESITY MEDICATION SIDE EFFECTS: none  Recommend the following: Increase Mounjaro 7.5mg weekly continue whole foods based eating strategy limiting refined carbs and added fats.  Focus on meal prep, trying new recipes, building healthy habits with time. Utilize weekends to build meal calendar for week with easy meals for nights of activities.  Stress/time management.  Optimize sleep 7-8 hours/night.  Continue [employer] 5 days/week  rtc in 6 weeks w/Dr Rodriguez and 2 months w/me

## 2024-09-03 DIAGNOSIS — E11.9 TYPE 2 DIABETES MELLITUS WITHOUT COMPLICATIONS: ICD-10-CM

## 2024-09-03 DIAGNOSIS — E66.9 OBESITY, UNSPECIFIED: ICD-10-CM

## 2024-09-13 ENCOUNTER — APPOINTMENT (OUTPATIENT)
Dept: FAMILY MEDICINE | Facility: CLINIC | Age: 39
End: 2024-09-13
Payer: COMMERCIAL

## 2024-09-13 VITALS
DIASTOLIC BLOOD PRESSURE: 76 MMHG | HEART RATE: 73 BPM | OXYGEN SATURATION: 99 % | TEMPERATURE: 97.1 F | WEIGHT: 175 LBS | HEIGHT: 63 IN | SYSTOLIC BLOOD PRESSURE: 120 MMHG | BODY MASS INDEX: 31.01 KG/M2

## 2024-09-13 DIAGNOSIS — M54.2 CERVICALGIA: ICD-10-CM

## 2024-09-13 PROCEDURE — 99213 OFFICE O/P EST LOW 20 MIN: CPT

## 2024-09-13 NOTE — HEALTH RISK ASSESSMENT
[0] : 2) Feeling down, depressed, or hopeless: Not at all (0) [PHQ-2 Negative - No further assessment needed] : PHQ-2 Negative - No further assessment needed [Never] : Never [[id number]] : 0

## 2024-09-13 NOTE — PHYSICAL EXAM
[No Respiratory Distress] : no respiratory distress  [No Focal Deficits] : no focal deficits [Normal] : affect was normal and insight and judgment were intact [No Spinal Tenderness] : no spinal tenderness [de-identified] : tender, tight superior trapezius muscles, R > L, slight decreased ROM of neck

## 2024-09-13 NOTE — HISTORY OF PRESENT ILLNESS
[FreeTextEntry8] : Patient presents with neck pain. This started about 2 weeks ago. She had a stiff neck and could not turn her head fully to the left. This persisted for a week and improved but she still has pain and tightness of her neck. This seems so happen whenever she exercises regularly (running particularly). She has had several episodes over the past few years and would like to get to the bottom of what is causing these.

## 2024-09-13 NOTE — PLAN
[FreeTextEntry1] : X ray ordered as requested.  Will refer to PT.   Muscle relaxant offered. She declined for now but will consider this if her sympotms persist.

## 2024-09-13 NOTE — ASSESSMENT
[FreeTextEntry1] : She has neck pain consistent with trapezius muscle spasm triggered by exercise.   She would like an X ray to make sure her vertebrae are OK.  We discussed that physical therapy would be the best treatment option if this is muscle. Massage and acupuncture are also options.

## 2024-10-02 ENCOUNTER — OUTPATIENT (OUTPATIENT)
Dept: OUTPATIENT SERVICES | Facility: HOSPITAL | Age: 39
LOS: 1 days | End: 2024-10-02
Payer: COMMERCIAL

## 2024-10-02 ENCOUNTER — APPOINTMENT (OUTPATIENT)
Dept: BARIATRICS/WEIGHT MGMT | Facility: CLINIC | Age: 39
End: 2024-10-02
Payer: COMMERCIAL

## 2024-10-02 DIAGNOSIS — E66.9 OBESITY, UNSPECIFIED: ICD-10-CM

## 2024-10-02 PROCEDURE — 99213 OFFICE O/P EST LOW 20 MIN: CPT | Mod: 95

## 2024-10-02 PROCEDURE — G2211 COMPLEX E/M VISIT ADD ON: CPT | Mod: 95

## 2024-10-02 PROCEDURE — G0463: CPT

## 2024-10-02 RX ORDER — TIRZEPATIDE 10 MG/.5ML
10 INJECTION, SOLUTION SUBCUTANEOUS
Qty: 4 | Refills: 5 | Status: ACTIVE | COMMUNITY
Start: 2024-10-02 | End: 1900-01-01

## 2024-10-02 NOTE — ASSESSMENT
[FreeTextEntry1] : BARIATRIC SURGERY HISTORY: none  OBESITY COMORBIDITIES: HLD  ANTI-OBESITY MEDICATIONS: ozempic  OBESITY MEDICATION SIDE EFFECTS: none  Recommend the following:  whole foods based eating strategy limiting refined carbs and added fats - recommend WFPB or as close as possible given obesity, anxiety and strong CAD and DM2 family hx increase tirzepatide to 10mg cont with added intense exercise add famotidine 20mg BID x 2 weeks  rtc in 6 weeks.

## 2024-10-02 NOTE — HISTORY OF PRESENT ILLNESS
[FreeTextEntry1] : 39 yo woman with obesity, anxiety HLD and strong family hx of CAD and DM2 presents for initial obesity medicine eval.   max weight 2/2023: 212 current weight = 172 lbs (40 lb weight loss from max but only 4 lb weight loss over past few months) appetite generally better controlled has transitioned up tirzepatide from semaglutide and now tolerating up 7.5mg feels appetite has been a little bit reduced dealing with a neck injury - about to start PT - has been out of [facility] recently because of this mild epigastric pain similar to acid issues of the past  Otherwise without new complaints today.

## 2024-10-03 DIAGNOSIS — I10 ESSENTIAL (PRIMARY) HYPERTENSION: ICD-10-CM

## 2024-10-07 DIAGNOSIS — E66.9 OBESITY, UNSPECIFIED: ICD-10-CM

## 2024-10-24 ENCOUNTER — NON-APPOINTMENT (OUTPATIENT)
Age: 39
End: 2024-10-24

## 2024-11-07 ENCOUNTER — APPOINTMENT (OUTPATIENT)
Dept: RADIOLOGY | Facility: IMAGING CENTER | Age: 39
End: 2024-11-07
Payer: COMMERCIAL

## 2024-11-07 ENCOUNTER — OUTPATIENT (OUTPATIENT)
Dept: OUTPATIENT SERVICES | Facility: HOSPITAL | Age: 39
LOS: 1 days | End: 2024-11-07
Payer: COMMERCIAL

## 2024-11-07 ENCOUNTER — APPOINTMENT (OUTPATIENT)
Dept: BARIATRICS/WEIGHT MGMT | Facility: CLINIC | Age: 39
End: 2024-11-07

## 2024-11-07 ENCOUNTER — RESULT REVIEW (OUTPATIENT)
Age: 39
End: 2024-11-07

## 2024-11-07 DIAGNOSIS — M54.2 CERVICALGIA: ICD-10-CM

## 2024-11-07 PROCEDURE — 72040 X-RAY EXAM NECK SPINE 2-3 VW: CPT

## 2024-11-07 PROCEDURE — 72040 X-RAY EXAM NECK SPINE 2-3 VW: CPT | Mod: 26

## 2024-11-12 ENCOUNTER — TRANSCRIPTION ENCOUNTER (OUTPATIENT)
Age: 39
End: 2024-11-12

## 2024-11-15 ENCOUNTER — OUTPATIENT (OUTPATIENT)
Dept: OUTPATIENT SERVICES | Facility: HOSPITAL | Age: 39
LOS: 1 days | End: 2024-11-15
Payer: COMMERCIAL

## 2024-11-15 ENCOUNTER — APPOINTMENT (OUTPATIENT)
Dept: BARIATRICS/WEIGHT MGMT | Facility: CLINIC | Age: 39
End: 2024-11-15
Payer: COMMERCIAL

## 2024-11-15 DIAGNOSIS — I10 ESSENTIAL (PRIMARY) HYPERTENSION: ICD-10-CM

## 2024-11-15 DIAGNOSIS — E66.01 MORBID (SEVERE) OBESITY DUE TO EXCESS CALORIES: ICD-10-CM

## 2024-11-15 PROCEDURE — 99213 OFFICE O/P EST LOW 20 MIN: CPT | Mod: 95

## 2024-11-15 PROCEDURE — G0463: CPT

## 2024-11-15 PROCEDURE — G2211 COMPLEX E/M VISIT ADD ON: CPT | Mod: 95

## 2024-11-15 RX ORDER — TIRZEPATIDE 12.5 MG/.5ML
12.5 INJECTION, SOLUTION SUBCUTANEOUS
Qty: 12 | Refills: 1 | Status: ACTIVE | COMMUNITY
Start: 2024-11-15 | End: 1900-01-01

## 2024-12-10 RX ORDER — TIRZEPATIDE 12.5 MG/.5ML
12.5 INJECTION, SOLUTION SUBCUTANEOUS
Qty: 12 | Refills: 1 | Status: ACTIVE | COMMUNITY
Start: 2024-12-10 | End: 1900-01-01

## 2025-01-14 ENCOUNTER — OUTPATIENT (OUTPATIENT)
Dept: OUTPATIENT SERVICES | Facility: HOSPITAL | Age: 40
LOS: 1 days | End: 2025-01-14

## 2025-01-14 ENCOUNTER — APPOINTMENT (OUTPATIENT)
Dept: BARIATRICS/WEIGHT MGMT | Facility: CLINIC | Age: 40
End: 2025-01-14
Payer: COMMERCIAL

## 2025-01-14 DIAGNOSIS — I10 ESSENTIAL (PRIMARY) HYPERTENSION: ICD-10-CM

## 2025-01-14 DIAGNOSIS — E66.9 OBESITY, UNSPECIFIED: ICD-10-CM

## 2025-01-14 PROCEDURE — 99213 OFFICE O/P EST LOW 20 MIN: CPT | Mod: 95

## 2025-05-14 ENCOUNTER — OUTPATIENT (OUTPATIENT)
Dept: OUTPATIENT SERVICES | Facility: HOSPITAL | Age: 40
LOS: 1 days | End: 2025-05-14

## 2025-05-14 ENCOUNTER — APPOINTMENT (OUTPATIENT)
Dept: BARIATRICS/WEIGHT MGMT | Facility: CLINIC | Age: 40
End: 2025-05-14
Payer: COMMERCIAL

## 2025-05-14 DIAGNOSIS — E66.9 OBESITY, UNSPECIFIED: ICD-10-CM

## 2025-05-14 PROCEDURE — G2211 COMPLEX E/M VISIT ADD ON: CPT | Mod: NC,95

## 2025-05-14 PROCEDURE — 99213 OFFICE O/P EST LOW 20 MIN: CPT | Mod: 95

## 2025-05-15 DIAGNOSIS — I10 ESSENTIAL (PRIMARY) HYPERTENSION: ICD-10-CM

## 2025-05-21 NOTE — PATIENT PROFILE OB - DELIVERY COMPLICATIONS; NUCHAL CORD
Rosalba KELLY Nolanon called to request a refill on her medication.      Last office visit : 3/7/2025   Next office visit : 5/27/2025     Requested Prescriptions     Pending Prescriptions Disp Refills    ZEPBOUND 2.5 MG/0.5ML SOLN subcutaneous solution [Pharmacy Med Name: ZEPBOUND 2.5 MG/0.5ML SUBCUTANEOUS SOLUTION] 2 mL 1     Sig: INJECT 0.5 ML (2.5 MG) UNDER THE SKIN ONCE WEEKLY (0.5ML= 50 UNITS)            Beata Flannery MA   x4 CAN X 2

## 2025-06-02 ENCOUNTER — NON-APPOINTMENT (OUTPATIENT)
Age: 40
End: 2025-06-02

## 2025-06-04 ENCOUNTER — APPOINTMENT (OUTPATIENT)
Dept: FAMILY MEDICINE | Facility: CLINIC | Age: 40
End: 2025-06-04
Payer: COMMERCIAL

## 2025-06-04 ENCOUNTER — NON-APPOINTMENT (OUTPATIENT)
Age: 40
End: 2025-06-04

## 2025-06-04 VITALS
TEMPERATURE: 97.8 F | SYSTOLIC BLOOD PRESSURE: 122 MMHG | WEIGHT: 171 LBS | HEIGHT: 63 IN | OXYGEN SATURATION: 100 % | HEART RATE: 73 BPM | DIASTOLIC BLOOD PRESSURE: 82 MMHG | BODY MASS INDEX: 30.3 KG/M2

## 2025-06-04 DIAGNOSIS — E78.2 MIXED HYPERLIPIDEMIA: ICD-10-CM

## 2025-06-04 DIAGNOSIS — F41.9 ANXIETY DISORDER, UNSPECIFIED: ICD-10-CM

## 2025-06-04 DIAGNOSIS — E11.9 TYPE 2 DIABETES MELLITUS W/OUT COMPLICATIONS: ICD-10-CM

## 2025-06-04 DIAGNOSIS — E66.811 OBESITY, CLASS 1: ICD-10-CM

## 2025-06-04 DIAGNOSIS — Z00.00 ENCOUNTER FOR GENERAL ADULT MEDICAL EXAMINATION W/OUT ABNORMAL FINDINGS: ICD-10-CM

## 2025-06-04 PROCEDURE — 99213 OFFICE O/P EST LOW 20 MIN: CPT | Mod: 25

## 2025-06-04 PROCEDURE — 36415 COLL VENOUS BLD VENIPUNCTURE: CPT

## 2025-06-04 PROCEDURE — 99395 PREV VISIT EST AGE 18-39: CPT

## 2025-06-04 RX ORDER — UBROGEPANT 100 MG/1
TABLET ORAL
Refills: 0 | Status: ACTIVE | COMMUNITY

## 2025-06-05 ENCOUNTER — TRANSCRIPTION ENCOUNTER (OUTPATIENT)
Age: 40
End: 2025-06-05

## 2025-06-05 LAB
25(OH)D3 SERPL-MCNC: 31.1 NG/ML
ALBUMIN SERPL ELPH-MCNC: 4.6 G/DL
ALP BLD-CCNC: 72 U/L
ALT SERPL-CCNC: 29 U/L
ANION GAP SERPL CALC-SCNC: 15 MMOL/L
AST SERPL-CCNC: 21 U/L
BASOPHILS # BLD AUTO: 0.03 K/UL
BASOPHILS NFR BLD AUTO: 0.5 %
BILIRUB SERPL-MCNC: 0.6 MG/DL
BUN SERPL-MCNC: 13 MG/DL
CALCIUM SERPL-MCNC: 9.4 MG/DL
CHLORIDE SERPL-SCNC: 104 MMOL/L
CHOLEST SERPL-MCNC: 203 MG/DL
CO2 SERPL-SCNC: 22 MMOL/L
CREAT SERPL-MCNC: 0.85 MG/DL
EGFRCR SERPLBLD CKD-EPI 2021: 89 ML/MIN/1.73M2
EOSINOPHIL # BLD AUTO: 0.09 K/UL
EOSINOPHIL NFR BLD AUTO: 1.5 %
ESTIMATED AVERAGE GLUCOSE: 97 MG/DL
GLUCOSE SERPL-MCNC: 78 MG/DL
HBA1C MFR BLD HPLC: 5 %
HCT VFR BLD CALC: 42.6 %
HDLC SERPL-MCNC: 40 MG/DL
HGB BLD-MCNC: 13.8 G/DL
IMM GRANULOCYTES NFR BLD AUTO: 0.2 %
LDLC SERPL-MCNC: 144 MG/DL
LYMPHOCYTES # BLD AUTO: 2.07 K/UL
LYMPHOCYTES NFR BLD AUTO: 34.8 %
MAN DIFF?: NORMAL
MCHC RBC-ENTMCNC: 28.3 PG
MCHC RBC-ENTMCNC: 32.4 G/DL
MCV RBC AUTO: 87.5 FL
MEV IGG FLD QL IA: 181 AU/ML
MEV IGG+IGM SER-IMP: POSITIVE
MONOCYTES # BLD AUTO: 0.35 K/UL
MONOCYTES NFR BLD AUTO: 5.9 %
MUV AB SER-ACNC: POSITIVE
MUV IGG SER QL IA: >300 AU/ML
NEUTROPHILS # BLD AUTO: 3.39 K/UL
NEUTROPHILS NFR BLD AUTO: 57.1 %
NONHDLC SERPL-MCNC: 163 MG/DL
PLATELET # BLD AUTO: 262 K/UL
POTASSIUM SERPL-SCNC: 4.1 MMOL/L
PROT SERPL-MCNC: 7.4 G/DL
RBC # BLD: 4.87 M/UL
RBC # FLD: 12.5 %
RUBV IGG FLD-ACNC: 11.7 INDEX
RUBV IGG SER-IMP: POSITIVE
SODIUM SERPL-SCNC: 140 MMOL/L
TRIGL SERPL-MCNC: 108 MG/DL
TSH SERPL-ACNC: 1.6 UIU/ML
VZV AB TITR SER: POSITIVE
VZV IGG SER IF-ACNC: 15.5 S/CO
WBC # FLD AUTO: 5.94 K/UL

## 2025-06-09 LAB
M TB IFN-G BLD-IMP: NEGATIVE
QUANTIFERON TB PLUS MITOGEN MINUS NIL: 9.71 IU/ML
QUANTIFERON TB PLUS NIL: 0.02 IU/ML
QUANTIFERON TB PLUS TB1 MINUS NIL: 0 IU/ML
QUANTIFERON TB PLUS TB2 MINUS NIL: 0 IU/ML

## 2025-07-07 ENCOUNTER — OUTPATIENT (OUTPATIENT)
Dept: OUTPATIENT SERVICES | Facility: HOSPITAL | Age: 40
LOS: 1 days | End: 2025-07-07
Payer: COMMERCIAL

## 2025-07-07 ENCOUNTER — APPOINTMENT (OUTPATIENT)
Dept: MRI IMAGING | Facility: IMAGING CENTER | Age: 40
End: 2025-07-07
Payer: COMMERCIAL

## 2025-07-07 ENCOUNTER — RESULT REVIEW (OUTPATIENT)
Age: 40
End: 2025-07-07

## 2025-07-07 DIAGNOSIS — R51.9 HEADACHE, UNSPECIFIED: ICD-10-CM

## 2025-07-07 PROCEDURE — 70551 MRI BRAIN STEM W/O DYE: CPT

## 2025-07-07 PROCEDURE — 70551 MRI BRAIN STEM W/O DYE: CPT | Mod: 26

## 2025-07-18 ENCOUNTER — APPOINTMENT (OUTPATIENT)
Dept: OTOLARYNGOLOGY | Facility: CLINIC | Age: 40
End: 2025-07-18

## 2025-07-21 ENCOUNTER — APPOINTMENT (OUTPATIENT)
Dept: MRI IMAGING | Facility: CLINIC | Age: 40
End: 2025-07-21
Payer: COMMERCIAL

## 2025-07-21 ENCOUNTER — RESULT REVIEW (OUTPATIENT)
Age: 40
End: 2025-07-21

## 2025-07-21 ENCOUNTER — OUTPATIENT (OUTPATIENT)
Dept: OUTPATIENT SERVICES | Facility: HOSPITAL | Age: 40
LOS: 1 days | End: 2025-07-21
Payer: COMMERCIAL

## 2025-07-21 DIAGNOSIS — Z00.8 ENCOUNTER FOR OTHER GENERAL EXAMINATION: ICD-10-CM

## 2025-07-21 DIAGNOSIS — R51.9 HEADACHE, UNSPECIFIED: ICD-10-CM

## 2025-07-21 DIAGNOSIS — G43.909 MIGRAINE, UNSPECIFIED, NOT INTRACTABLE, WITHOUT STATUS MIGRAINOSUS: ICD-10-CM

## 2025-07-21 PROCEDURE — A9585: CPT

## 2025-07-21 PROCEDURE — 70553 MRI BRAIN STEM W/O & W/DYE: CPT | Mod: 26

## 2025-07-21 PROCEDURE — 70553 MRI BRAIN STEM W/O & W/DYE: CPT

## 2025-07-24 ENCOUNTER — APPOINTMENT (OUTPATIENT)
Dept: OTOLARYNGOLOGY | Facility: CLINIC | Age: 40
End: 2025-07-24

## 2025-09-15 ENCOUNTER — RX RENEWAL (OUTPATIENT)
Age: 40
End: 2025-09-15